# Patient Record
Sex: MALE | Race: WHITE | NOT HISPANIC OR LATINO | Employment: OTHER | URBAN - METROPOLITAN AREA
[De-identification: names, ages, dates, MRNs, and addresses within clinical notes are randomized per-mention and may not be internally consistent; named-entity substitution may affect disease eponyms.]

---

## 2019-10-22 ENCOUNTER — OFFICE VISIT (OUTPATIENT)
Dept: DERMATOLOGY | Facility: CLINIC | Age: 79
End: 2019-10-22
Payer: MEDICARE

## 2019-10-22 VITALS — WEIGHT: 285 LBS

## 2019-10-22 DIAGNOSIS — D48.5 NEOPLASM OF UNCERTAIN BEHAVIOR OF SKIN: Primary | ICD-10-CM

## 2019-10-22 DIAGNOSIS — R20.2 NOTALGIA PARESTHETICA: ICD-10-CM

## 2019-10-22 DIAGNOSIS — L81.4 LENTIGINES: ICD-10-CM

## 2019-10-22 DIAGNOSIS — Z85.828 HISTORY OF SKIN CANCER: ICD-10-CM

## 2019-10-22 DIAGNOSIS — L82.1 SEBORRHEIC KERATOSES: ICD-10-CM

## 2019-10-22 PROCEDURE — 1101F PR PT FALLS ASSESS DOC 0-1 FALLS W/OUT INJ PAST YR: ICD-10-PCS | Mod: HCWC,CPTII,S$GLB, | Performed by: DERMATOLOGY

## 2019-10-22 PROCEDURE — 1101F PT FALLS ASSESS-DOCD LE1/YR: CPT | Mod: HCWC,CPTII,S$GLB, | Performed by: DERMATOLOGY

## 2019-10-22 PROCEDURE — 99202 PR OFFICE/OUTPT VISIT, NEW, LEVL II, 15-29 MIN: ICD-10-PCS | Mod: 25,HCWC,S$GLB, | Performed by: DERMATOLOGY

## 2019-10-22 PROCEDURE — 88305 TISSUE SPECIMEN TO PATHOLOGY, DERMATOLOGY: ICD-10-PCS | Mod: 26,HCWC,, | Performed by: PATHOLOGY

## 2019-10-22 PROCEDURE — 99999 PR PBB SHADOW E&M-NEW PATIENT-LVL II: CPT | Mod: PBBFAC,HCWC,, | Performed by: DERMATOLOGY

## 2019-10-22 PROCEDURE — 88305 TISSUE EXAM BY PATHOLOGIST: CPT | Mod: HCWC | Performed by: PATHOLOGY

## 2019-10-22 PROCEDURE — 99999 PR PBB SHADOW E&M-NEW PATIENT-LVL II: ICD-10-PCS | Mod: PBBFAC,HCWC,, | Performed by: DERMATOLOGY

## 2019-10-22 PROCEDURE — 11102 TANGNTL BX SKIN SINGLE LES: CPT | Mod: HCWC,S$GLB,, | Performed by: DERMATOLOGY

## 2019-10-22 PROCEDURE — 11102 PR TANGENTIAL BIOPSY, SKIN, SINGLE LESION: ICD-10-PCS | Mod: HCWC,S$GLB,, | Performed by: DERMATOLOGY

## 2019-10-22 PROCEDURE — 99202 OFFICE O/P NEW SF 15 MIN: CPT | Mod: 25,HCWC,S$GLB, | Performed by: DERMATOLOGY

## 2019-10-22 NOTE — PROGRESS NOTES
Subjective:       Patient ID:  Corey Esquivel is a 79 y.o. male who presents for   Chief Complaint   Patient presents with    Skin Check     UBSE    Itching     back     This is a high risk patient here to check for the development of new lesions.  New lesion on neck which he has scratched, previously seen per Dr Ochsner has had 2 bcc removed on forehead.     Itching  - Initial  Affected locations: face, back, chest, left arm, right arm and forehead  Signs / symptoms: itching  Aggravated by: nothing  Relieving factors/Treatments tried: nothing        Review of Systems   Constitutional: Negative for fever, chills, weight loss, weight gain, fatigue, night sweats and malaise.   Skin: Positive for itching, daily sunscreen use, activity-related sunscreen use and wears hat.   Hematologic/Lymphatic: Bruises/bleeds easily.        Objective:    Physical Exam   Constitutional: He appears well-developed and well-nourished. No distress.   Neurological: He is alert and oriented to person, place, and time. He is not disoriented.   Psychiatric: He has a normal mood and affect.   Skin:   Areas Examined (abnormalities noted in diagram):   Scalp / Hair Palpated and Inspected  Head / Face Inspection Performed  Neck Inspection Performed  Chest / Axilla Inspection Performed  Abdomen Inspection Performed  Back Inspection Performed  RUE Inspected  LUE Inspection Performed                   Diagram Legend     Erythematous scaling macule/papule c/w actinic keratosis       Vascular papule c/w angioma      Pigmented verrucoid papule/plaque c/w seborrheic keratosis      Yellow umbilicated papule c/w sebaceous hyperplasia      Irregularly shaped tan macule c/w lentigo     1-2 mm smooth white papules consistent with Milia      Movable subcutaneous cyst with punctum c/w epidermal inclusion cyst      Subcutaneous movable cyst c/w pilar cyst      Firm pink to brown papule c/w dermatofibroma      Pedunculated fleshy papule(s) c/w skin tag(s)       Evenly pigmented macule c/w junctional nevus     Mildly variegated pigmented, slightly irregular-bordered macule c/w mildly atypical nevus      Flesh colored to evenly pigmented papule c/w intradermal nevus       Pink pearly papule/plaque c/w basal cell carcinoma      Erythematous hyperkeratotic cursted plaque c/w SCC      Surgical scar with no sign of skin cancer recurrence      Open and closed comedones      Inflammatory papules and pustules      Verrucoid papule consistent consistent with wart     Erythematous eczematous patches and plaques     Dystrophic onycholytic nail with subungual debris c/w onychomycosis     Umbilicated papule    Erythematous-base heme-crusted tan verrucoid plaque consistent with inflamed seborrheic keratosis     Erythematous Silvery Scaling Plaque c/w Psoriasis     See annotation      Assessment / Plan:      Pathology Orders:     Normal Orders This Visit    Tissue Specimen To Pathology, Dermatology     Questions:    Directional Terms:  Other(comment)    Clinical Information:  r/o bcc    Specific Site:  left anterior neck        Neoplasm of uncertain behavior of skin  -     Tissue Specimen To Pathology, Dermatology  Shave biopsy performed after verbal consent including risk of infection, scar, recurrence, need for additional treatment of site. Area prepped with alcohol, anesthetized with 1% lidocaine with epinephrine. . Hemostasis achieved with monsels. No complications. Dressing applied. Wound care explained. Will need further rx if + ca          History of skin cancer  Comments:  bcc x 2 on forehead    Lentigines  sunscreen    Seborrheic keratoses  reassurance      Notalgia paresthetica  No hot water  cerave with pramoxine           Follow up in about 6 months (around 4/22/2020).

## 2019-10-25 ENCOUNTER — TELEPHONE (OUTPATIENT)
Dept: CARDIOLOGY | Facility: CLINIC | Age: 79
End: 2019-10-25

## 2019-10-25 NOTE — TELEPHONE ENCOUNTER
----- Message from Renate Cheney sent at 10/25/2019 11:10 AM CDT -----  Contact: pt daughter  Pt daughter shay called looking to schedule for her father ASAP he is a new pt and next anabella not until dec     Pt is having blood pressure and heart rate issues    Pt daughter shay can be reached at [phone]

## 2019-10-25 NOTE — TELEPHONE ENCOUNTER
Spoke to pt daughter, Leigh Ann     Stated she will speak to you to accommodate the pt in for sooner appointment

## 2019-10-31 ENCOUNTER — OFFICE VISIT (OUTPATIENT)
Dept: CARDIOLOGY | Facility: CLINIC | Age: 79
End: 2019-10-31
Payer: MEDICARE

## 2019-10-31 ENCOUNTER — LAB VISIT (OUTPATIENT)
Dept: LAB | Facility: HOSPITAL | Age: 79
End: 2019-10-31
Attending: INTERNAL MEDICINE
Payer: MEDICARE

## 2019-10-31 VITALS
WEIGHT: 251 LBS | HEIGHT: 76 IN | BODY MASS INDEX: 30.56 KG/M2 | DIASTOLIC BLOOD PRESSURE: 80 MMHG | HEART RATE: 53 BPM | SYSTOLIC BLOOD PRESSURE: 140 MMHG

## 2019-10-31 DIAGNOSIS — E66.09 CLASS 1 OBESITY DUE TO EXCESS CALORIES WITHOUT SERIOUS COMORBIDITY WITH BODY MASS INDEX (BMI) OF 30.0 TO 30.9 IN ADULT: ICD-10-CM

## 2019-10-31 DIAGNOSIS — R00.1 BRADYCARDIA: Primary | ICD-10-CM

## 2019-10-31 DIAGNOSIS — E78.2 MIXED HYPERLIPIDEMIA: ICD-10-CM

## 2019-10-31 DIAGNOSIS — I44.0 FIRST DEGREE AV BLOCK: ICD-10-CM

## 2019-10-31 LAB
ALBUMIN SERPL BCP-MCNC: 3.3 G/DL (ref 3.5–5.2)
ALP SERPL-CCNC: 123 U/L (ref 55–135)
ALT SERPL W/O P-5'-P-CCNC: 27 U/L (ref 10–44)
ANION GAP SERPL CALC-SCNC: 7 MMOL/L (ref 8–16)
AST SERPL-CCNC: 38 U/L (ref 10–40)
BASOPHILS # BLD AUTO: 0.02 K/UL (ref 0–0.2)
BASOPHILS NFR BLD: 0.5 % (ref 0–1.9)
BILIRUB SERPL-MCNC: 1.2 MG/DL (ref 0.1–1)
BUN SERPL-MCNC: 15 MG/DL (ref 8–23)
CALCIUM SERPL-MCNC: 9.4 MG/DL (ref 8.7–10.5)
CHLORIDE SERPL-SCNC: 107 MMOL/L (ref 95–110)
CHOLEST SERPL-MCNC: 150 MG/DL (ref 120–199)
CHOLEST/HDLC SERPL: 3.1 {RATIO} (ref 2–5)
CO2 SERPL-SCNC: 28 MMOL/L (ref 23–29)
CREAT SERPL-MCNC: 1 MG/DL (ref 0.5–1.4)
DIFFERENTIAL METHOD: ABNORMAL
EOSINOPHIL # BLD AUTO: 0.3 K/UL (ref 0–0.5)
EOSINOPHIL NFR BLD: 5.6 % (ref 0–8)
ERYTHROCYTE [DISTWIDTH] IN BLOOD BY AUTOMATED COUNT: 13.2 % (ref 11.5–14.5)
EST. GFR  (AFRICAN AMERICAN): >60 ML/MIN/1.73 M^2
EST. GFR  (NON AFRICAN AMERICAN): >60 ML/MIN/1.73 M^2
GLUCOSE SERPL-MCNC: 106 MG/DL (ref 70–110)
HCT VFR BLD AUTO: 40.8 % (ref 40–54)
HDLC SERPL-MCNC: 49 MG/DL (ref 40–75)
HDLC SERPL: 32.7 % (ref 20–50)
HGB BLD-MCNC: 13.3 G/DL (ref 14–18)
LDLC SERPL CALC-MCNC: 87 MG/DL (ref 63–159)
LYMPHOCYTES # BLD AUTO: 1.7 K/UL (ref 1–4.8)
LYMPHOCYTES NFR BLD: 37.4 % (ref 18–48)
MCH RBC QN AUTO: 31.7 PG (ref 27–31)
MCHC RBC AUTO-ENTMCNC: 32.6 G/DL (ref 32–36)
MCV RBC AUTO: 97 FL (ref 82–98)
MONOCYTES # BLD AUTO: 0.4 K/UL (ref 0.3–1)
MONOCYTES NFR BLD: 8.6 % (ref 4–15)
NEUTROPHILS # BLD AUTO: 2.1 K/UL (ref 1.8–7.7)
NEUTROPHILS NFR BLD: 47.9 % (ref 38–73)
NONHDLC SERPL-MCNC: 101 MG/DL
PLATELET # BLD AUTO: 160 K/UL (ref 150–350)
PMV BLD AUTO: 10.4 FL (ref 9.2–12.9)
POTASSIUM SERPL-SCNC: 4.3 MMOL/L (ref 3.5–5.1)
PROT SERPL-MCNC: 6.5 G/DL (ref 6–8.4)
RBC # BLD AUTO: 4.2 M/UL (ref 4.6–6.2)
SODIUM SERPL-SCNC: 142 MMOL/L (ref 136–145)
TRIGL SERPL-MCNC: 70 MG/DL (ref 30–150)
WBC # BLD AUTO: 4.44 K/UL (ref 3.9–12.7)

## 2019-10-31 PROCEDURE — 1101F PT FALLS ASSESS-DOCD LE1/YR: CPT | Mod: HCWC,CPTII,S$GLB, | Performed by: INTERNAL MEDICINE

## 2019-10-31 PROCEDURE — 99205 PR OFFICE/OUTPT VISIT, NEW, LEVL V, 60-74 MIN: ICD-10-PCS | Mod: HCWC,S$GLB,, | Performed by: INTERNAL MEDICINE

## 2019-10-31 PROCEDURE — 85025 COMPLETE CBC W/AUTO DIFF WBC: CPT | Mod: HCWC

## 2019-10-31 PROCEDURE — 80061 LIPID PANEL: CPT | Mod: HCWC

## 2019-10-31 PROCEDURE — 36415 COLL VENOUS BLD VENIPUNCTURE: CPT | Mod: HCWC

## 2019-10-31 PROCEDURE — 99999 PR PBB SHADOW E&M-EST. PATIENT-LVL III: ICD-10-PCS | Mod: PBBFAC,HCWC,, | Performed by: INTERNAL MEDICINE

## 2019-10-31 PROCEDURE — 99999 PR PBB SHADOW E&M-EST. PATIENT-LVL III: CPT | Mod: PBBFAC,HCWC,, | Performed by: INTERNAL MEDICINE

## 2019-10-31 PROCEDURE — 1101F PR PT FALLS ASSESS DOC 0-1 FALLS W/OUT INJ PAST YR: ICD-10-PCS | Mod: HCWC,CPTII,S$GLB, | Performed by: INTERNAL MEDICINE

## 2019-10-31 PROCEDURE — 93000 EKG 12-LEAD: ICD-10-PCS | Mod: HCWC,S$GLB,, | Performed by: INTERNAL MEDICINE

## 2019-10-31 PROCEDURE — 93000 ELECTROCARDIOGRAM COMPLETE: CPT | Mod: HCWC,S$GLB,, | Performed by: INTERNAL MEDICINE

## 2019-10-31 PROCEDURE — 99205 OFFICE O/P NEW HI 60 MIN: CPT | Mod: HCWC,S$GLB,, | Performed by: INTERNAL MEDICINE

## 2019-10-31 PROCEDURE — 80053 COMPREHEN METABOLIC PANEL: CPT | Mod: HCWC

## 2019-10-31 NOTE — PATIENT INSTRUCTIONS
Low-Salt Choices  Eating salt (sodium) can make your body retain too much water. Excess water makes your heart work harder. Canned, packaged, and frozen foods are easy to prepare, but they are often high in sodium. Here are some ideas for low-salt foods you can easily prepare yourself.    For breakfast  · Fruit or 100% fruit juice  · Whole-wheat bread or an English muffin. Compare sodium content on labels.  · Low-fat milk or yogurt  · Unsalted eggs  · Shredded wheat  · Corn tortillas  · Unsalted steamed rice  · Regular (not instant) hot cereal, made without salt  Stay away from:  · Sausage, royal, and ham  · Flour tortillas  · Packaged muffins, pancakes, and biscuits  · Instant hot cereals  · Cottage cheese  For lunch and dinner  · Fresh fish, chicken, turkey, or meat--baked, broiled, or roasted without salt  · Dry beans, cooked without salt  · Tofu, stir-fried without salt  · Unsalted fresh fruit and vegetables, or frozen or canned fruit and vegetables with no added salt  Stay away from:  · Lunch or deli meat that is cured or smoked  · Cheese  · Tomato juice and catsup  · Canned vegetables, soups, and fish not labeled as no-salt-added or reduced sodium  · Packaged gravies and sauces  · Olives, pickles, and relish  · Bottled salad dressings  For snacks and desserts  · Yogurt  · Unsalted, air popped popcorn  · Unsalted nuts or seeds  Stay away from:  · Pies and cakes  · Packaged dessert mixes  · Pizza  · Canned and packaged puddings  · Pretzels, chips, crackers, and nuts--unless the label says unsalted  Date Last Reviewed: 6/17/2015  © 1775-8855 [employer]. [address] [place] [address], [place] [address]. All rights reserved. This information is not intended as a substitute for professional medical care. Always follow your healthcare professional's instructions.        Low-Salt Choices  Eating salt (sodium) can make your body retain too much water. Excess water makes your heart work harder. Canned,  packaged, and frozen foods are easy to prepare, but they are often high in sodium. Here are some ideas for low-salt foods you can easily prepare yourself.    For breakfast  · Fruit or 100% fruit juice  · Whole-wheat bread or an English muffin. Compare sodium content on labels.  · Low-fat milk or yogurt  · Unsalted eggs  · Shredded wheat  · Corn tortillas  · Unsalted steamed rice  · Regular (not instant) hot cereal, made without salt  Stay away from:  · Sausage, royal, and ham  · Flour tortillas  · Packaged muffins, pancakes, and biscuits  · Instant hot cereals  · Cottage cheese  For lunch and dinner  · Fresh fish, chicken, turkey, or meat--baked, broiled, or roasted without salt  · Dry beans, cooked without salt  · Tofu, stir-fried without salt  · Unsalted fresh fruit and vegetables, or frozen or canned fruit and vegetables with no added salt  Stay away from:  · Lunch or deli meat that is cured or smoked  · Cheese  · Tomato juice and catsup  · Canned vegetables, soups, and fish not labeled as no-salt-added or reduced sodium  · Packaged gravies and sauces  · Olives, pickles, and relish  · Bottled salad dressings  For snacks and desserts  · Yogurt  · Unsalted, air popped popcorn  · Unsalted nuts or seeds  Stay away from:  · Pies and cakes  · Packaged dessert mixes  · Pizza  · Canned and packaged puddings  · Pretzels, chips, crackers, and nuts--unless the label says unsalted  Date Last Reviewed: 6/17/2015  © 0033-6554 [employer]. [address] [place] [address], [place] [address]. All rights reserved. This information is not intended as a substitute for professional medical care. Always follow your healthcare professional's instructions.

## 2019-10-31 NOTE — PROGRESS NOTES
Subjective:   Patient ID:  Corey Esquivel is a 79 y.o. male who presents for evaluation of Bradycardia; first degree AVB; and Obesity      HPI:       He is here with his wife to establish cardiovascular care. ECG revealed SB with 1 AVB, HR 52. He is asymptomatic. No cardiac issues. He lost 40 lbs with exercise and dietary changes in past 4 months. BP at home is 120/70 mmHg range.             Patient Active Problem List    Diagnosis Date Noted    Class 1 obesity due to excess calories without serious comorbidity with body mass index (BMI) of 30.0 to 30.9 in adult 10/31/2019    Bradycardia 10/31/2019    First degree AV block 10/31/2019    Mixed hyperlipidemia 10/31/2019           Right Arm BP - Sittin/80  Left Arm BP - Sittin/80            Review of Systems   Constitution: Negative for diaphoresis, night sweats, weight gain and weight loss.   HENT: Negative for congestion.    Eyes: Negative for blurred vision, discharge and double vision.   Cardiovascular: Negative for chest pain, claudication, cyanosis, dyspnea on exertion, irregular heartbeat, leg swelling, near-syncope, orthopnea, palpitations, paroxysmal nocturnal dyspnea and syncope.   Respiratory: Negative for cough, shortness of breath and wheezing.    Endocrine: Negative for cold intolerance, heat intolerance and polyphagia.   Hematologic/Lymphatic: Negative for adenopathy and bleeding problem. Does not bruise/bleed easily.   Skin: Negative for dry skin and nail changes.   Musculoskeletal: Negative for arthritis, back pain, falls, joint pain, myalgias and neck pain.   Gastrointestinal: Negative for bloating, abdominal pain, change in bowel habit and constipation.   Genitourinary: Negative for bladder incontinence, dysuria, flank pain, genital sores and missed menses.   Neurological: Negative for aphonia, brief paralysis, difficulty with concentration, dizziness and weakness.   Psychiatric/Behavioral: Negative for altered mental status and memory  loss. The patient does not have insomnia.    Allergic/Immunologic: Negative for environmental allergies.       Objective:   Physical Exam   Constitutional: He is oriented to person, place, and time. He appears well-developed and well-nourished. He is not intubated.   HENT:   Head: Normocephalic and atraumatic.   Right Ear: External ear normal.   Left Ear: External ear normal.   Mouth/Throat: Oropharynx is clear and moist.   Eyes: Pupils are equal, round, and reactive to light. Conjunctivae and EOM are normal. Right eye exhibits no discharge. Left eye exhibits no discharge. No scleral icterus.   Neck: Normal range of motion. Neck supple. Normal carotid pulses, no hepatojugular reflux and no JVD present. Carotid bruit is not present. No tracheal deviation present. No thyromegaly present.   Cardiovascular: Normal rate, regular rhythm, S1 normal and S2 normal.  No extrasystoles are present. PMI is not displaced. Exam reveals no gallop, no S3, no distant heart sounds, no friction rub and no midsystolic click.   No murmur heard.  Pulses:       Carotid pulses are 2+ on the right side, and 2+ on the left side.       Radial pulses are 2+ on the right side, and 2+ on the left side.        Femoral pulses are 2+ on the right side, and 2+ on the left side.       Popliteal pulses are 2+ on the right side, and 2+ on the left side.        Dorsalis pedis pulses are 2+ on the right side, and 2+ on the left side.        Posterior tibial pulses are 2+ on the right side, and 2+ on the left side.   Pulmonary/Chest: Effort normal and breath sounds normal. No accessory muscle usage or stridor. No apnea, no tachypnea and no bradypnea. He is not intubated. No respiratory distress. He has no decreased breath sounds. He has no wheezes. He has no rales. He exhibits no tenderness and no bony tenderness.   Abdominal: He exhibits no distension, no pulsatile liver, no abdominal bruit, no ascites, no pulsatile midline mass and no mass. There is no  tenderness. There is no rebound and no guarding.   Musculoskeletal: Normal range of motion. He exhibits no edema or tenderness.   Lymphadenopathy:     He has no cervical adenopathy.   Neurological: He is alert and oriented to person, place, and time. He has normal reflexes. No cranial nerve deficit. Coordination normal.   Skin: Skin is warm. No rash noted. No erythema. No pallor.   Psychiatric: He has a normal mood and affect. His behavior is normal. Judgment and thought content normal.       Assessment:     1. Bradycardia    2. Mixed hyperlipidemia    3. Class 1 obesity due to excess calories without serious comorbidity with body mass index (BMI) of 30.0 to 30.9 in adult    4. First degree AV block        Plan:           Weight loss  Exercise  Low salt diet        No intervention for asymptomatic bradycardia  Reassurance         Lipid panel, cbc, and cmp for wellness        Continue with current medical plan and lifestyle changes.  Return sooner for concerns or questions. If symptoms persist go to the ED  I have reviewed all pertinent data on this patient       I have reviewed the patient's medical history in detail and updated the computerized patient record.    Orders Placed This Encounter   Procedures    CBC auto differential     Standing Status:   Future     Number of Occurrences:   1     Standing Expiration Date:   12/29/2020    Comprehensive metabolic panel     Standing Status:   Future     Number of Occurrences:   1     Standing Expiration Date:   12/29/2020    Lipid panel     Standing Status:   Future     Number of Occurrences:   1     Standing Expiration Date:   12/29/2020    IN OFFICE EKG 12-LEAD (to Muse)     Order Specific Question:   Diagnosis     Answer:   Bradycardia [[id number]]       Follow up as scheduled. Return sooner for concerns or questions            He expressed verbal understanding and agreed with the plan        Patient's Medications   New Prescriptions    No medications on file    Previous Medications    CETIRIZINE HCL/PSEUDOEPHEDRINE (ZYRTEC-D ORAL)    Take by mouth.    GLUC/MICHAEL-MSM#2/C/D3/MARK/BORN (GLUCOSAM-CHONDR-MSM WITH VIT D ORAL)    Take by mouth.   Modified Medications    No medications on file   Discontinued Medications    No medications on file

## 2019-11-22 ENCOUNTER — PROCEDURE VISIT (OUTPATIENT)
Dept: DERMATOLOGY | Facility: CLINIC | Age: 79
End: 2019-11-22
Payer: MEDICARE

## 2019-11-22 VITALS
DIASTOLIC BLOOD PRESSURE: 79 MMHG | WEIGHT: 251.13 LBS | BODY MASS INDEX: 30.58 KG/M2 | SYSTOLIC BLOOD PRESSURE: 146 MMHG | HEART RATE: 54 BPM | HEIGHT: 76 IN

## 2019-11-22 DIAGNOSIS — C44.41 BASAL CELL CARCINOMA OF LEFT SIDE OF NECK: Primary | ICD-10-CM

## 2019-11-22 PROCEDURE — 13132 CMPLX RPR F/C/C/M/N/AX/G/H/F: CPT | Mod: 51,HCWC,S$GLB, | Performed by: DERMATOLOGY

## 2019-11-22 PROCEDURE — 13132 PR RECMPL WND HEAD,FAC,HAND 2.6-7.5 CM: ICD-10-PCS | Mod: 51,HCWC,S$GLB, | Performed by: DERMATOLOGY

## 2019-11-22 PROCEDURE — 99499 NO LOS: ICD-10-PCS | Mod: HCWC,S$GLB,, | Performed by: DERMATOLOGY

## 2019-11-22 PROCEDURE — 17311: ICD-10-PCS | Mod: HCWC,S$GLB,, | Performed by: DERMATOLOGY

## 2019-11-22 PROCEDURE — 17312: ICD-10-PCS | Mod: HCWC,S$GLB,, | Performed by: DERMATOLOGY

## 2019-11-22 PROCEDURE — 17312 MOHS ADDL STAGE: CPT | Mod: HCWC,S$GLB,, | Performed by: DERMATOLOGY

## 2019-11-22 PROCEDURE — 17311 MOHS 1 STAGE H/N/HF/G: CPT | Mod: HCWC,S$GLB,, | Performed by: DERMATOLOGY

## 2019-11-22 PROCEDURE — 99499 UNLISTED E&M SERVICE: CPT | Mod: HCWC,S$GLB,, | Performed by: DERMATOLOGY

## 2019-11-22 RX ORDER — HYDROCODONE BITARTRATE AND ACETAMINOPHEN 5; 325 MG/1; MG/1
1 TABLET ORAL EVERY 6 HOURS PRN
Qty: 10 TABLET | Refills: 0 | Status: SHIPPED | OUTPATIENT
Start: 2019-11-22 | End: 2022-09-19 | Stop reason: ALTCHOICE

## 2019-12-03 ENCOUNTER — OFFICE VISIT (OUTPATIENT)
Dept: DERMATOLOGY | Facility: CLINIC | Age: 79
End: 2019-12-03
Payer: MEDICARE

## 2019-12-03 DIAGNOSIS — Z09 POSTOP CHECK: Primary | ICD-10-CM

## 2019-12-03 PROCEDURE — 99999 PR PBB SHADOW E&M-EST. PATIENT-LVL II: ICD-10-PCS | Mod: PBBFAC,HCWC,, | Performed by: DERMATOLOGY

## 2019-12-03 PROCEDURE — 99999 PR PBB SHADOW E&M-EST. PATIENT-LVL II: CPT | Mod: PBBFAC,HCWC,, | Performed by: DERMATOLOGY

## 2019-12-03 PROCEDURE — 99024 PR POST-OP FOLLOW-UP VISIT: ICD-10-PCS | Mod: HCWC,S$GLB,, | Performed by: DERMATOLOGY

## 2019-12-03 PROCEDURE — 99024 POSTOP FOLLOW-UP VISIT: CPT | Mod: HCWC,S$GLB,, | Performed by: DERMATOLOGY

## 2019-12-03 NOTE — PROGRESS NOTES
79 y.o. male patient is here for suture removal following Mohs' surgery.    Patient reports no problems.    WOUND PE:  The L anterior neck sutures intact. Wound healing well. Good skin edges. No signs or symptoms of infection.    IMPRESSION:  Healing operative site from Mohs' surgery, BCC L anterior neck s/p Mohs with CLC, postop day #7.    PLAN:  Sutures removed today. Steri-strips applied.  Continue wound care.  Keep moist with Aquaphor.    RTC:  In 3-6 months with Maria D Huddleston M.D. for skin check or sooner if new concern arises.

## 2022-07-16 ENCOUNTER — NURSE TRIAGE (OUTPATIENT)
Dept: ADMINISTRATIVE | Facility: CLINIC | Age: 82
End: 2022-07-16
Payer: MEDICARE

## 2022-07-16 NOTE — TELEPHONE ENCOUNTER
[place]    PCP:  Dr. ALIE Llamas    Pt escalated to triage queue for foot issues.  Pt states he already has an appt for Aug 25th and declines triage.  Instructed to call for questions/concerns.  VU.    Reason for Disposition   [1] Follow-up call to recent contact AND [2] information only call, no triage required    Additional Information   Negative: RN needs further essential information from caller in order to complete triage   Negative: Requesting regular office appointment   Negative: [1] Caller requesting NON-URGENT health information AND [2] PCP's office is the best resource   Negative: Health Information question, no triage required and triager able to answer question   Negative: General information question, no triage required and triager able to answer question   Negative: Question about upcoming scheduled test, no triage required and triager able to answer question   Negative: [1] Caller is not with the adult (patient) AND [2] probable NON-URGENT symptoms    Protocols used: INFORMATION ONLY CALL - [facility] TRIAGE[facility]

## 2022-09-19 ENCOUNTER — HOSPITAL ENCOUNTER (EMERGENCY)
Facility: HOSPITAL | Age: 82
Discharge: HOME | End: 2022-09-19
Attending: EMERGENCY MEDICINE
Payer: MEDICARE

## 2022-09-19 VITALS
WEIGHT: 270 LBS | BODY MASS INDEX: 32.88 KG/M2 | RESPIRATION RATE: 18 BRPM | HEART RATE: 68 BPM | TEMPERATURE: 99 F | DIASTOLIC BLOOD PRESSURE: 80 MMHG | SYSTOLIC BLOOD PRESSURE: 148 MMHG | OXYGEN SATURATION: 96 % | HEIGHT: 76 IN

## 2022-09-19 DIAGNOSIS — S61.215A LACERATION OF LEFT RING FINGER WITHOUT FOREIGN BODY WITHOUT DAMAGE TO NAIL, INITIAL ENCOUNTER: Primary | ICD-10-CM

## 2022-09-19 DIAGNOSIS — S61.213A LACERATION OF LEFT MIDDLE FINGER WITHOUT FOREIGN BODY WITHOUT DAMAGE TO NAIL, INITIAL ENCOUNTER: ICD-10-CM

## 2022-09-19 PROCEDURE — 12002 RPR S/N/AX/GEN/TRNK2.6-7.5CM: CPT | Mod: 59

## 2022-09-19 PROCEDURE — 90715 TDAP VACCINE 7 YRS/> IM: CPT | Performed by: EMERGENCY MEDICINE

## 2022-09-19 PROCEDURE — 63600175 PHARM REV CODE 636 W HCPCS: Performed by: EMERGENCY MEDICINE

## 2022-09-19 PROCEDURE — 25000003 PHARM REV CODE 250: Performed by: EMERGENCY MEDICINE

## 2022-09-19 PROCEDURE — 99284 EMERGENCY DEPT VISIT MOD MDM: CPT | Mod: 25

## 2022-09-19 PROCEDURE — 12042 INTMD RPR N-HF/GENIT2.6-7.5: CPT

## 2022-09-19 PROCEDURE — 90471 IMMUNIZATION ADMIN: CPT | Performed by: EMERGENCY MEDICINE

## 2022-09-19 RX ORDER — LIDOCAINE HYDROCHLORIDE 10 MG/ML
5 INJECTION, SOLUTION EPIDURAL; INFILTRATION; INTRACAUDAL; PERINEURAL
Status: COMPLETED | OUTPATIENT
Start: 2022-09-19 | End: 2022-09-19

## 2022-09-19 RX ORDER — TRAMADOL HYDROCHLORIDE 50 MG/1
50 TABLET ORAL EVERY 12 HOURS PRN
Qty: 6 TABLET | Refills: 0 | Status: SHIPPED | OUTPATIENT
Start: 2022-09-19 | End: 2022-11-07

## 2022-09-19 RX ORDER — CEPHALEXIN 250 MG/1
250 CAPSULE ORAL EVERY 12 HOURS
Qty: 6 CAPSULE | Refills: 0 | Status: SHIPPED | OUTPATIENT
Start: 2022-09-19 | End: 2022-09-22

## 2022-09-19 RX ADMIN — LIDOCAINE HYDROCHLORIDE 50 MG: 10 INJECTION, SOLUTION EPIDURAL; INFILTRATION; INTRACAUDAL at 02:09

## 2022-09-19 RX ADMIN — TETANUS TOXOID, REDUCED DIPHTHERIA TOXOID AND ACELLULAR PERTUSSIS VACCINE, ADSORBED 0.5 ML: 5; 2.5; 8; 8; 2.5 SUSPENSION INTRAMUSCULAR at 02:09

## 2022-09-19 NOTE — ED PROVIDER NOTES
Encounter Date: 9/19/2022       History     Chief Complaint   Patient presents with    Laceration     Pt was using a new hand saw, and got laceration noted to left middle and ring finger bleeding controled      HPI  This is a 82 y.o. male who has no past medical history on file.     The patient presents to the Emergency Department with laceration to left hand after using a new hand saw.  Patient sustained lacerations to his middle and ring finger.  Denies any numbness or inability to fingers.  Denies any other injury.  Tetanus unknown.      Review of patient's allergies indicates:  No Known Allergies  No past medical history on file.  Past Surgical History:   Procedure Laterality Date    ABDOMINAL SURGERY      CHOLECYSTECTOMY      HERNIA REPAIR      SHOULDER SURGERY       No family history on file.  Social History     Tobacco Use    Smoking status: Former    Smokeless tobacco: Former   Substance Use Topics    Alcohol use: No    Drug use: No     Review of Systems   Constitutional:  Negative for activity change.   Skin:  Positive for wound.   Neurological:  Negative for weakness and numbness.   Hematological:  Bruises/bleeds easily.     Physical Exam     Initial Vitals [09/19/22 1236]   BP Pulse Resp Temp SpO2   (!) 148/80 68 18 98.5 °F (36.9 °C) 96 %      MAP       --         Physical Exam    Nursing note and vitals reviewed.  Constitutional: He appears well-developed and well-nourished. He is not diaphoretic. No distress.   HENT:   Head: Normocephalic and atraumatic.   Mouth/Throat: Oropharynx is clear and moist.   Eyes: Conjunctivae are normal.   Cardiovascular:  Normal rate and regular rhythm.           Pulmonary/Chest: No respiratory distress.     Neurological: He is alert and oriented to person, place, and time.   Skin: Skin is warm and dry. Capillary refill takes less than 2 seconds. No rash noted. No pallor.   Irregularly shaped laceration to left middle finger, full thickness skin, 3.5cm in length, no  vascular or muscle/tendon involvement.     Irregularly shaped laceration to left middle finger, full thickness skin, 3.5cm in length, no vascular or muscle/tendon involvement. There is a skin flap that covers the middle of the wound.    Multiple other small lacerations to the left hand including V-shaped wound at the 3rd MTP volar aspect about 0.5cm in length.   Psychiatric: He has a normal mood and affect.       ED Course   Lac Repair    Date/Time: 9/19/2022 4:23 PM  Performed by: Rajan Silva MD  Authorized by: Rajan Silva MD     Consent:     Consent obtained:  Verbal    Consent given by:  Patient    Risks, benefits, and alternatives were discussed: yes    Laceration details:     Location:  Finger    Finger location:  L long finger    Length (cm):  3.5    Depth (mm):  4  Pre-procedure details:     Preparation:  Patient was prepped and draped in usual sterile fashion  Exploration:     Hemostasis achieved with:  Direct pressure    Imaging outcome: foreign body not noted      Wound exploration: wound explored through full range of motion and entire depth of wound visualized      Wound extent: vascular damage      Wound extent: no tendon damage noted      Contaminated: no    Treatment:     Area cleansed with:  Povidone-iodine and saline    Amount of cleaning:  Extensive    Irrigation solution:  Sterile saline    Debridement:  Minimal    Undermining:  Minimal    Scar revision: no    Skin repair:     Repair method:  Sutures    Suture size:  5-0    Suture material:  Prolene    Suture technique:  Simple interrupted and figure eight    Number of sutures:  5  Approximation:     Approximation:  Close  Repair type:     Repair type:  Intermediate  Post-procedure details:     Dressing:  Non-adherent dressing    Procedure completion:  Tolerated well, no immediate complications  Lac Repair    Date/Time: 9/19/2022 4:26 PM  Performed by: Rajan Silva MD  Authorized by: Rajan Silva MD     Consent:     Consent obtained:   Verbal    Consent given by:  Patient    Risks, benefits, and alternatives were discussed: yes    Laceration details:     Location:  Finger    Finger location:  L ring finger    Length (cm):  3.5    Depth (mm):  3  Exploration:     Wound exploration: wound explored through full range of motion and entire depth of wound visualized      Wound extent: no muscle damage noted, no tendon damage noted and no vascular damage noted      Contaminated: no    Treatment:     Area cleansed with:  Povidone-iodine and saline    Amount of cleaning:  Standard    Irrigation solution:  Sterile saline    Debridement:  None    Undermining:  Minimal  Skin repair:     Repair method:  Sutures  Approximation:     Approximation:  Close  Repair type:     Repair type:  Simple  Post-procedure details:     Dressing:  Non-adherent dressing    Procedure completion:  Tolerated well, no immediate complications  Lac Repair    Date/Time: 9/19/2022 4:29 PM  Performed by: Rajan Silva MD  Authorized by: Rajan Silva MD     Consent:     Consent obtained:  Verbal    Consent given by:  Patient    Risks, benefits, and alternatives were discussed: yes    Laceration details:     Location:  Hand    Hand location:  L palm    Length (cm):  0.5    Depth (mm):  3  Exploration:     Wound extent: no muscle damage noted, no nerve damage noted and no vascular damage noted    Treatment:     Area cleansed with:  Povidone-iodine and saline    Amount of cleaning:  Standard    Irrigation solution:  Sterile saline    Debridement:  None    Undermining:  None  Skin repair:     Repair method:  Sutures    Suture size:  5-0    Suture material:  Prolene    Suture technique:  Simple interrupted    Number of sutures:  1  Approximation:     Approximation:  Close  Repair type:     Repair type:  Simple  Labs Reviewed - No data to display       Imaging Results              X-Ray Hand 2 View Left (Final result)  Result time 09/19/22 15:24:42      Final result by Justin Devries MD  (09/19/22 15:24:42)                   Impression:      No soft tissue abnormality.    Diffuse osteopenia.  No evidence of fracture or dislocation.    Osteoarthrosis of the left hand.      Electronically signed by: Justin Devries MD  Date:    09/19/2022  Time:    15:24               Narrative:    EXAMINATION:  XR HAND 2 VIEW LEFT    CLINICAL HISTORY:  Laceration without foreign body of left middle finger without damage to nail, initial encounter    TECHNIQUE:  Three x-ray views of the left hand.    COMPARISON:  None    FINDINGS:  There is diffuse demineralization of the osseous structures.  There is no cortical step-off.  There is no evidence of periostitis.    There is significant joint space narrowing and sclerosis involving the base of the 1st and 2nd metacarpals.  There is also marked joint space narrowing involving the 5th proximal and distal interphalangeal joints.    There is chondrocalcinosis of in the base of the proximal carpal row.  The soft tissues are unremarkable.  No radiopaque foreign body is identified.                                       Medications   Tdap (BOOSTRIX) vaccine injection 0.5 mL (0.5 mLs Intramuscular Given 9/19/22 1435)   LIDOcaine (PF) 10 mg/ml (1%) injection 50 mg (50 mg Infiltration Given 9/19/22 1439)                 ED Course as of 09/19/22 1628   Mon Sep 19, 2022   1454 X-Ray Hand 2 View Left  No fracture as independently interpreted by me [NP]      ED Course User Index  [NP] Rajan Silva MD          82-year-old male presents to the emergency department for evaluation of lacerations to left hand after her injury with a hand saw.  X-ray revealed no bony injury.  Lacerations repaired as above.    Patient advised that despite adequate irrigation and antibiotics possibility of retained body and/or infection exists, and swelling, redness, drainage, fever, or any other concerns should prompt immediate return to ED.    Discussed wound care instructions and suture removal in 10 days.        Clinical Impression:   Final diagnoses:  [S61.215A] Laceration of left ring finger without foreign body without damage to nail, initial encounter (Primary)  [C11.213A] Laceration of left middle finger without foreign body without damage to nail, initial encounter        ED Disposition Condition    Discharge Stable          ED Prescriptions       Medication Sig Dispense Start Date End Date Auth. Provider    cephALEXin (KEFLEX) 250 MG capsule Take 1 capsule (250 mg total) by mouth every 12 (twelve) hours. for 3 days 6 capsule 9/19/2022 9/22/2022 Rajan Silva MD    traMADoL (ULTRAM) 50 mg tablet Take 1 tablet (50 mg total) by mouth every 12 (twelve) hours as needed for Pain. 6 tablet 9/19/2022 -- Rajan Silva MD          Follow-up Information       Follow up With Specialties Details Why Contact Info    your PCP  Schedule an appointment as soon as possible for a visit in 10 days For suture removal              Rajan Silva MD  09/19/22 4298       Rajan Silva MD  09/19/22 8603

## 2022-09-29 ENCOUNTER — HOSPITAL ENCOUNTER (EMERGENCY)
Facility: HOSPITAL | Age: 82
Discharge: HOME | End: 2022-09-29
Attending: EMERGENCY MEDICINE
Payer: MEDICARE

## 2022-09-29 VITALS
BODY MASS INDEX: 31.66 KG/M2 | HEIGHT: 76 IN | RESPIRATION RATE: 18 BRPM | DIASTOLIC BLOOD PRESSURE: 75 MMHG | WEIGHT: 260 LBS | OXYGEN SATURATION: 95 % | SYSTOLIC BLOOD PRESSURE: 150 MMHG | HEART RATE: 61 BPM | TEMPERATURE: 98 F

## 2022-09-29 DIAGNOSIS — Z48.02 VISIT FOR SUTURE REMOVAL: Primary | ICD-10-CM

## 2022-09-29 PROCEDURE — 99999 HC NO LEVEL OF SERVICE - ED ONLY: CPT

## 2022-09-29 PROCEDURE — 99283 EMERGENCY DEPT VISIT LOW MDM: CPT

## 2022-09-29 PROCEDURE — 25000003 PHARM REV CODE 250

## 2022-09-29 RX ORDER — BACITRACIN ZINC 500 UNIT/G
OINTMENT (GRAM) TOPICAL 2 TIMES DAILY
Qty: 30 G | Refills: 0 | Status: SHIPPED | OUTPATIENT
Start: 2022-09-29 | End: 2022-11-07

## 2022-09-29 RX ADMIN — NEOMYCIN-BACITRACIN-POLYMYXIN OINT 1 EACH: OINTMENT at 12:09

## 2022-09-29 NOTE — ED PROVIDER NOTES
Encounter Date: 9/29/2022       History     Chief Complaint   Patient presents with    Suture / Staple Removal     Report to ED to have stitches removed from left hand, reports in place for 10 days, closely approximated and free of drainage     Patient is a 82 year old male who presents to the ED for suture removal. Patient cut his left middle and ring finger using a saw 10 days ago. He denies any drainage or bleeding. He denies fever, numbness or tingling.    A ten point review of systems was completed and is negative except as documented above.  Patient denies any other acute medical complaint.    The patients available PMH, PSH, Social History, medications, allergies, and triage vital signs were reviewed just prior to their medical evaluation.      Review of patient's allergies indicates:  No Known Allergies  History reviewed. No pertinent past medical history.  Past Surgical History:   Procedure Laterality Date    ABDOMINAL SURGERY      CHOLECYSTECTOMY      HERNIA REPAIR      SHOULDER SURGERY       History reviewed. No pertinent family history.  Social History     Tobacco Use    Smoking status: Former    Smokeless tobacco: Former   Substance Use Topics    Alcohol use: No    Drug use: No     Review of Systems   Constitutional:  Negative for fever.   HENT:  Negative for sore throat.    Respiratory:  Negative for shortness of breath.    Cardiovascular:  Negative for chest pain.   Gastrointestinal:  Negative for nausea.   Genitourinary:  Negative for dysuria.   Musculoskeletal:  Negative for back pain, gait problem and neck pain.   Skin:  Positive for wound. Negative for rash.   Neurological:  Negative for weakness.   Hematological:  Does not bruise/bleed easily.   Psychiatric/Behavioral:  Negative for agitation and behavioral problems.      Physical Exam     Initial Vitals [09/29/22 1146]   BP Pulse Resp Temp SpO2   (!) 150/75 61 18 98.1 °F (36.7 °C) 95 %      MAP       --         Physical Exam    Nursing note and  vitals reviewed.  Constitutional: He appears well-developed and well-nourished. No distress.   HENT:   Head: Normocephalic and atraumatic.   Eyes: Pupils are equal, round, and reactive to light. Right eye exhibits no discharge.   Neck: Neck supple.   Cardiovascular:  Normal rate and regular rhythm.           Pulmonary/Chest: Breath sounds normal. No respiratory distress.   Abdominal: Abdomen is soft. He exhibits no distension. There is no abdominal tenderness.   Musculoskeletal:         General: Normal range of motion.      Cervical back: Neck supple.     Neurological: He is alert and oriented to person, place, and time.   Skin: Skin is warm and dry.   Lacerations to left middle and ring fingers with sutures in place  Suture line clean, dry and intact  Full ROM, sensation intact     Psychiatric: He has a normal mood and affect. His behavior is normal.       ED Course   Suture Removal    Date/Time: 9/29/2022 3:22 PM  Location procedure was performed: [facility] EMERGENCY DEPARTMENT  Performed by: Emma Bajwa PA-C  Authorized by: Neel Ramos MD   Body area: upper extremity (Left middle and ring finger)  Wound Appearance: nontender, well healed and clean  Post-removal: antibiotic ointment applied  Facility: sutures placed in this facility  Complications: No  Specimens: No  Implants: No      Labs Reviewed - No data to display       Imaging Results    None          Medications   neomycin-bacitracnZn-polymyxnB packet 1 each (1 each Topical (Top) Given 9/29/22 1230)     Medical Decision Making:   Initial Assessment:   Patient is a 82 year old male who presents to the ED for suture removal. Patient cut his left middle and ring finger using a saw 10 days ago. He denies any drainage or bleeding. He denies fever, numbness or tingling.      Differential Diagnosis:   Suture removal  ED Management:  Suture removal  -Afebrile, vital signs stable, no apparent distress  -Sutures removed in the ED  -Pt tolerated  procedure well, no immediate complications    Plan:  -Antibiotic ointment as needed   -Patient is in stable condition to be discharged home. Advised patient to follow up with primary care doctor and return to the ED if experiencing any worsening of symptoms.                   Emma Bajwa PA-C  Emergency Medicine           Clinical Impression:   Final diagnoses:  [Z48.02] Visit for suture removal (Primary)        ED Disposition Condition    Discharge Stable          ED Prescriptions       Medication Sig Dispense Start Date End Date Auth. Provider    bacitracin 500 unit/gram Oint Apply topically 2 (two) times daily. 30 g 9/29/2022 -- Emma Bajwa PA-C          Follow-up Information    None          Emma Bajwa PA-C  09/29/22 3251

## 2022-09-29 NOTE — DISCHARGE INSTRUCTIONS
-F/u with primary care doctor and return to the ER if you are experiencing any worsening of symptoms    Thank you for letting myself and our team take care for you today! It was nice meeting you, and I hope you feel better very soon. Please come back to [facility] for all of your future medical needs.   Our goal in the ER is to always give you outstanding care and exceptional service. You may receive a survey by mail or email in the next week about your experience in our ED. We would greatly appreciate you completing and returning the survey. Your feedback provides us with a way to recognize our staff who give very good care and it helps us learn how to improve when your experience was below our aspiration of excellence.     Sincerely,     Emma Bajwa PA-C  Emergency Room Physician Assistant  [facility]

## 2022-09-29 NOTE — ED NOTES
Pt presents to ED from  home for suture removal. Pt had stitches placed to left 2nd and 3rd finger 10 days ago. Mechanism of injury was a chain saw. No redness or drainage noted. Lacerations appear to be healing well.     Pt is alert, age appropriate and in no acute distress. Pt is nontoxic appearing. Respirations are even and unlabored. pt denies change in feeding, bowel or bladder habits. Skin is warm and color is appropriate for ethnicity.   Pt moves all extremities well. Conjunctivae normal. Pt is dressed appropriately and well groomed

## 2022-11-07 ENCOUNTER — HOSPITAL ENCOUNTER (OUTPATIENT)
Facility: HOSPITAL | Age: 82
Discharge: HOME | End: 2022-11-09
Attending: EMERGENCY MEDICINE | Admitting: HOSPITALIST
Payer: MEDICARE

## 2022-11-07 DIAGNOSIS — R06.02 SHORTNESS OF BREATH: ICD-10-CM

## 2022-11-07 DIAGNOSIS — R17 TOTAL BILIRUBIN, ELEVATED: ICD-10-CM

## 2022-11-07 DIAGNOSIS — C22.0 HCC (HEPATOCELLULAR CARCINOMA): ICD-10-CM

## 2022-11-07 DIAGNOSIS — I81 PORTAL VEIN THROMBOSIS: ICD-10-CM

## 2022-11-07 DIAGNOSIS — R18.8 CIRRHOSIS OF LIVER WITH ASCITES, UNSPECIFIED HEPATIC CIRRHOSIS TYPE: Primary | ICD-10-CM

## 2022-11-07 DIAGNOSIS — K74.60 CIRRHOSIS OF LIVER WITH ASCITES, UNSPECIFIED HEPATIC CIRRHOSIS TYPE: Primary | ICD-10-CM

## 2022-11-07 PROBLEM — R74.01 TRANSAMINITIS: Status: ACTIVE | Noted: 2022-11-07

## 2022-11-07 LAB
ALBUMIN SERPL BCP-MCNC: 2.7 G/DL (ref 3.5–5.2)
ALP SERPL-CCNC: 315 U/L (ref 55–135)
ALT SERPL W/O P-5'-P-CCNC: 75 U/L (ref 10–44)
ANION GAP SERPL CALC-SCNC: 11 MMOL/L (ref 8–16)
APTT BLDCRRT: 25.8 SEC (ref 21–32)
AST SERPL-CCNC: 124 U/L (ref 10–40)
BASOPHILS # BLD AUTO: 0.03 K/UL (ref 0–0.2)
BASOPHILS NFR BLD: 0.6 % (ref 0–1.9)
BILIRUB SERPL-MCNC: 4 MG/DL (ref 0.1–1)
BNP SERPL-MCNC: 54 PG/ML (ref 0–99)
BUN SERPL-MCNC: 23 MG/DL (ref 8–23)
CALCIUM SERPL-MCNC: 8.8 MG/DL (ref 8.7–10.5)
CHLORIDE SERPL-SCNC: 104 MMOL/L (ref 95–110)
CO2 SERPL-SCNC: 18 MMOL/L (ref 23–29)
CREAT SERPL-MCNC: 1.2 MG/DL (ref 0.5–1.4)
DIFFERENTIAL METHOD: ABNORMAL
EOSINOPHIL # BLD AUTO: 0.2 K/UL (ref 0–0.5)
EOSINOPHIL NFR BLD: 4.2 % (ref 0–8)
ERYTHROCYTE [DISTWIDTH] IN BLOOD BY AUTOMATED COUNT: 14.6 % (ref 11.5–14.5)
EST. GFR  (NO RACE VARIABLE): >60 ML/MIN/1.73 M^2
GLUCOSE SERPL-MCNC: 98 MG/DL (ref 70–110)
HAV IGM SERPL QL IA: NORMAL
HBV CORE IGM SERPL QL IA: NORMAL
HBV SURFACE AG SERPL QL IA: NORMAL
HCT VFR BLD AUTO: 38.7 % (ref 40–54)
HCV AB SERPL QL IA: NORMAL
HGB BLD-MCNC: 12.9 G/DL (ref 14–18)
IMM GRANULOCYTES # BLD AUTO: 0.01 K/UL (ref 0–0.04)
IMM GRANULOCYTES NFR BLD AUTO: 0.2 % (ref 0–0.5)
INR PPP: 1 (ref 0.8–1.2)
LACTATE SERPL-SCNC: 1.3 MMOL/L (ref 0.5–2.2)
LIPASE SERPL-CCNC: 49 U/L (ref 4–60)
LYMPHOCYTES # BLD AUTO: 1.4 K/UL (ref 1–4.8)
LYMPHOCYTES NFR BLD: 28.6 % (ref 18–48)
MAGNESIUM SERPL-MCNC: 2 MG/DL (ref 1.6–2.6)
MCH RBC QN AUTO: 32.7 PG (ref 27–31)
MCHC RBC AUTO-ENTMCNC: 33.3 G/DL (ref 32–36)
MCV RBC AUTO: 98 FL (ref 82–98)
MONOCYTES # BLD AUTO: 0.5 K/UL (ref 0.3–1)
MONOCYTES NFR BLD: 9.9 % (ref 4–15)
NEUTROPHILS # BLD AUTO: 2.8 K/UL (ref 1.8–7.7)
NEUTROPHILS NFR BLD: 56.5 % (ref 38–73)
NRBC BLD-RTO: 0 /100 WBC
PLATELET # BLD AUTO: 147 K/UL (ref 150–450)
PMV BLD AUTO: 10.9 FL (ref 9.2–12.9)
POTASSIUM SERPL-SCNC: 5.1 MMOL/L (ref 3.5–5.1)
PROT SERPL-MCNC: 6.4 G/DL (ref 6–8.4)
PROTHROMBIN TIME: 10.8 SEC (ref 9–12.5)
RBC # BLD AUTO: 3.95 M/UL (ref 4.6–6.2)
SODIUM SERPL-SCNC: 133 MMOL/L (ref 136–145)
TROPONIN I SERPL DL<=0.01 NG/ML-MCNC: 0.01 NG/ML (ref 0–0.03)
TSH SERPL DL<=0.005 MIU/L-ACNC: 2.98 UIU/ML (ref 0.4–4)
WBC # BLD AUTO: 4.96 K/UL (ref 3.9–12.7)

## 2022-11-07 PROCEDURE — 93010 EKG 12-LEAD: ICD-10-PCS | Mod: ,,, | Performed by: INTERNAL MEDICINE

## 2022-11-07 PROCEDURE — 84443 ASSAY THYROID STIM HORMONE: CPT | Performed by: EMERGENCY MEDICINE

## 2022-11-07 PROCEDURE — G0378 HOSPITAL OBSERVATION PER HR: HCPCS

## 2022-11-07 PROCEDURE — 83735 ASSAY OF MAGNESIUM: CPT | Performed by: EMERGENCY MEDICINE

## 2022-11-07 PROCEDURE — 80053 COMPREHEN METABOLIC PANEL: CPT | Performed by: EMERGENCY MEDICINE

## 2022-11-07 PROCEDURE — 63600175 PHARM REV CODE 636 W HCPCS: Performed by: HOSPITALIST

## 2022-11-07 PROCEDURE — 84484 ASSAY OF TROPONIN QUANT: CPT | Performed by: EMERGENCY MEDICINE

## 2022-11-07 PROCEDURE — 80074 ACUTE HEPATITIS PANEL: CPT | Performed by: EMERGENCY MEDICINE

## 2022-11-07 PROCEDURE — 83880 ASSAY OF NATRIURETIC PEPTIDE: CPT | Performed by: EMERGENCY MEDICINE

## 2022-11-07 PROCEDURE — 85025 COMPLETE CBC W/AUTO DIFF WBC: CPT | Performed by: EMERGENCY MEDICINE

## 2022-11-07 PROCEDURE — 99285 EMERGENCY DEPT VISIT HI MDM: CPT | Mod: 25

## 2022-11-07 PROCEDURE — 25500020 PHARM REV CODE 255: Performed by: EMERGENCY MEDICINE

## 2022-11-07 PROCEDURE — 93010 ELECTROCARDIOGRAM REPORT: CPT | Mod: ,,, | Performed by: INTERNAL MEDICINE

## 2022-11-07 PROCEDURE — 83690 ASSAY OF LIPASE: CPT | Performed by: EMERGENCY MEDICINE

## 2022-11-07 PROCEDURE — 96372 THER/PROPH/DIAG INJ SC/IM: CPT | Performed by: HOSPITALIST

## 2022-11-07 PROCEDURE — 83605 ASSAY OF LACTIC ACID: CPT | Performed by: EMERGENCY MEDICINE

## 2022-11-07 PROCEDURE — 85610 PROTHROMBIN TIME: CPT | Performed by: EMERGENCY MEDICINE

## 2022-11-07 PROCEDURE — 85730 THROMBOPLASTIN TIME PARTIAL: CPT | Performed by: EMERGENCY MEDICINE

## 2022-11-07 PROCEDURE — 25000003 PHARM REV CODE 250: Performed by: HOSPITALIST

## 2022-11-07 PROCEDURE — 93005 ELECTROCARDIOGRAM TRACING: CPT

## 2022-11-07 RX ORDER — TALC
6 POWDER (GRAM) TOPICAL NIGHTLY PRN
Status: DISCONTINUED | OUTPATIENT
Start: 2022-11-07 | End: 2022-11-09 | Stop reason: HOSPADM

## 2022-11-07 RX ORDER — HYDROCHLOROTHIAZIDE 25 MG/1
25 TABLET ORAL DAILY
COMMUNITY

## 2022-11-07 RX ORDER — ACETAMINOPHEN 325 MG/1
650 TABLET ORAL EVERY 4 HOURS PRN
Status: DISCONTINUED | OUTPATIENT
Start: 2022-11-07 | End: 2022-11-09 | Stop reason: HOSPADM

## 2022-11-07 RX ORDER — LISINOPRIL 10 MG/1
10 TABLET ORAL DAILY
COMMUNITY

## 2022-11-07 RX ORDER — ONDANSETRON 8 MG/1
8 TABLET, ORALLY DISINTEGRATING ORAL EVERY 8 HOURS PRN
Status: DISCONTINUED | OUTPATIENT
Start: 2022-11-07 | End: 2022-11-09 | Stop reason: HOSPADM

## 2022-11-07 RX ORDER — SODIUM CHLORIDE 0.9 % (FLUSH) 0.9 %
10 SYRINGE (ML) INJECTION EVERY 12 HOURS PRN
Status: DISCONTINUED | OUTPATIENT
Start: 2022-11-07 | End: 2022-11-09 | Stop reason: HOSPADM

## 2022-11-07 RX ORDER — HEPARIN SODIUM 5000 [USP'U]/ML
5000 INJECTION, SOLUTION INTRAVENOUS; SUBCUTANEOUS EVERY 8 HOURS
Status: DISCONTINUED | OUTPATIENT
Start: 2022-11-07 | End: 2022-11-09

## 2022-11-07 RX ADMIN — Medication 6 MG: at 10:11

## 2022-11-07 RX ADMIN — IOHEXOL 100 ML: 350 INJECTION, SOLUTION INTRAVENOUS at 11:11

## 2022-11-07 RX ADMIN — HEPARIN SODIUM 5000 UNITS: 5000 INJECTION, SOLUTION INTRAVENOUS; SUBCUTANEOUS at 10:11

## 2022-11-07 RX ADMIN — ACETAMINOPHEN 650 MG: 325 TABLET ORAL at 10:11

## 2022-11-07 NOTE — Clinical Note
Diagnosis: Shortness of breath [786.05.ICD-9-CM]   Future Attending Provider: JAUN DONALSDON [[id number]]   Admitting Provider:: JAUN DONALDSON [[id number]]

## 2022-11-07 NOTE — PHARMACY MED REC
"    [facility]           Pharmacy  Admission Medication History     The home medication history was taken by Divine Reid.      Medication history obtained from Medications listed below were obtained from: Patient/family    Based on information gathered for medication list, you may go to "Admission" then "Reconcile Home Medications" tabs to review and/or act upon those items.     The home medication list has been updated by the Pharmacy department.   Please read ALL comments highlighted in yellow.   Please address this information as you see fit.    Feel free to contact us if you have any questions or require assistance.    The medications listed below were removed from the home medication list.  Please reorder if appropriate:    Patient reports NOT TAKING the following medication(s):  Tramadol 50mg  Bacitracin oint      No current facility-administered medications on file prior to encounter.     Current Outpatient Medications on File Prior to Encounter   Medication Sig Dispense Refill    hydroCHLOROthiazide (HYDRODIURIL) 25 MG tablet Take 25 mg by mouth once daily.      lisinopriL 10 MG tablet Take 10 mg by mouth once daily.      cetirizine HCl/pseudoephedrine (ZYRTEC-D ORAL) Take by mouth.      GLUC/MICHAEL-MSM#2/C/D3/MARK/BORN (GLUCOSAM-CHONDR-MSM WITH VIT D ORAL) Take by mouth.         Please address this information as you see fit.  Feel free to contact us if you have any questions or require assistance.    Divine Reid  [phone]              .        "

## 2022-11-07 NOTE — ED PROVIDER NOTES
Encounter Date: 11/7/2022       History     Chief Complaint   Patient presents with    Shortness of Breath     Patient arrives for evaluation of SOB with CHANDRA for greater than one month - states not getting any better - denies cough or nausea or diaphoresis - states chest is tight when taking a deep breath - denies leg swelling     This is a generally healthy 82-year-old man who presents for evaluation of 3 months of worsening fatigue dyspnea.  Note intensifying stress at home related to a family member, as result he has stopped his active lifestyle and has become primarily sedentary and now with even minimal exertion is very short of breath. he denies any recent illnesses, fevers, chills, known sick contacts.  He had an episode in which she had some tense chest tightness and discomfort which prompted to come to the emergency department today.  He has not take anything specifically to try and help with this, nothing in particular seems to make it any better, it seems to be progressively worsening with time.    Review of patient's allergies indicates:  No Known Allergies  No past medical history on file.  Past Surgical History:   Procedure Laterality Date    ABDOMINAL SURGERY      CHOLECYSTECTOMY      HERNIA REPAIR      SHOULDER SURGERY       No family history on file.  Social History     Tobacco Use    Smoking status: Former    Smokeless tobacco: Former   Substance Use Topics    Alcohol use: No    Drug use: No     Review of Systems  Constitutional-no fever  HEENT-no congestion  Eyes-no redness  Respiratory-positive shortness of breath  Cardio-positive chest pain  GI-no abdominal pain  Endocrine-no cold intolerance  -no difficulty urinating  MSK-no myalgias  Skin-skin itching  Allergy-no environmental allergy  Neurologic-, no headache  Hematology-no swollen nodes  Behavioral-no confusion  Physical Exam     Initial Vitals [11/07/22 0743]   BP Pulse Resp Temp SpO2   (!) 156/71 78 (!) 24 98.4 °F (36.9 °C) (!) 94 %       MAP       --         Physical Exam  Constitutional:  82-year-old man in no obvious distress  Eyes: Conjunctivae normal.  ENT       Head: Normocephalic, atraumatic.       Nose: Normal external appearance        Mouth/Throat: no strigulous respirations   Hematological/Lymphatic/Immunilogical: no visible lymphadenopathy   Cardiovascular: Normal rate,   Respiratory: Normal respiratory effort.   Gastrointestinal:  Rounded, soft, nontender, no rebound, no guarding  Musculoskeletal: Normal range of motion in all extremities. No obvious deformities or swelling.  Neurologic: Alert, oriented. Normal speech and language. No gross focal neurologic deficits are appreciated.  Skin: Skin is warm, dry. No rash noted.  Psychiatric: Mood and affect are normal.   ED Course   Procedures  Labs Reviewed   CBC W/ AUTO DIFFERENTIAL - Abnormal; Notable for the following components:       Result Value    RBC 3.95 (*)     Hemoglobin 12.9 (*)     Hematocrit 38.7 (*)     MCH 32.7 (*)     RDW 14.6 (*)     Platelets 147 (*)     All other components within normal limits   COMPREHENSIVE METABOLIC PANEL - Abnormal; Notable for the following components:    Sodium 133 (*)     CO2 18 (*)     Albumin 2.7 (*)     Total Bilirubin 4.0 (*)     Alkaline Phosphatase 315 (*)      (*)     ALT 75 (*)     All other components within normal limits   B-TYPE NATRIURETIC PEPTIDE   LACTIC ACID, PLASMA   LIPASE   MAGNESIUM   TROPONIN I   PROTIME-INR   APTT   HEPATITIS PANEL, ACUTE        ECG Results              EKG 12-lead (In process)  Result time 11/07/22 13:14:11      In process by Interface, Lab In [facility] (11/07/22 13:14:11)                   Narrative:    Test Reason : R06.02,    Vent. Rate : 072 BPM     Atrial Rate : 072 BPM     P-R Int : 248 ms          QRS Dur : 102 ms      QT Int : 406 ms       P-R-T Axes : 012 085 007 degrees     QTc Int : 444 ms    Sinus rhythm with 1st degree A-V block  Incomplete right bundle branch block  Septal infarct ,age  undetermined  Abnormal ECG  When compared with ECG of 31-OCT-2019 08:03,  Incomplete right bundle branch block is now Present  Septal infarct is now Present    Referred By: [email or web address]   SELF           Confirmed By:                                      EKG 12-lead (Preliminary result)  Result time 11/07/22 09:31:42      ED Interpretation by Daniel Cope MD (11/07/22 09:31:42, [facility] Emergency Dept, Emergency Medicine)    My EKG interpretation, sinus rhythm, 72 beats per minute, normal axis, mild T-wave depression 1, T-wave inversion 3, poor R-wave progression when compared to previous EKG 10 /31/2019 poor R-wave progession is new                                  Imaging Results               US Liver with Doppler (xpd) (Final result)  Result time 11/07/22 15:36:55      Final result by Alessio Holland MD (11/07/22 15:36:55)                   Impression:      In correlation with same day CT, findings concerning for at least partially thrombosed intrahepatic main portal vein near the confluence and right portal vein (with reversed flow).    Cirrhosis with sequela of portal hypertension to include splenomegaly and mild upper abdominal ascites.  Indeterminate lesion in the right hepatic lobe.  Suggest further correlation with nonemergent liver protocol CT or MRI.    This report was flagged in Epic as abnormal.      Electronically signed by: Alessio Holland  Date:    11/07/2022  Time:    15:36               Narrative:    EXAMINATION:  US LIVER WITH DOPPLER    CLINICAL HISTORY:  liver injury;    TECHNIQUE:  Duplex scan of the liver was performed using B-mode/gray scale imaging and Doppler spectral analysis and color flow.    COMPARISON:  CT abdomen pelvis with contrast dated same day.    FINDINGS:  Liver measures at the upper limits of normal in size measuring 16.4 cm with coarsened echotexture throughout.  Measured hypoechoic area in the right lobe at 1.9 x 2.0 x 1.4 cm with possible mild peripheral  vascularity.  The gallbladder is absent.  Extrahepatic duct measures 0.3 cm.  No intrahepatic biliary dilatation.  Spleen is enlarged measuring 15.6 cm with splenule noted.  Visualized pancreas is unremarkable.  Mild perihepatic ascites.    SMV appears patent.    Patent antegrade flow in the left portal vein.  Limited color visualization with intraluminal heterogeneity at the main portal vein and right portal vein concerning for thrombus with partial reversed flow at the right portal vein.    Hepatic arterial system is patent.    The middle, right, and left hepatic veins appear patent.    IVC is patent.  Splenic vasculature appears patent at the hilum.                                       CT Abdomen Pelvis With Contrast (Final result)  Result time 11/07/22 11:40:53      Final result by Justin Devries MD (11/07/22 11:40:53)                   Impression:      1.  Nodular contour of the liver along with perihepatic fluid, suggestive of cirrhosis with associated ascites.  Dedicated ultrasound of the liver may be obtained for further evaluation.    2.  Splenomegaly, probably sequela of portal hypertension given the cirrhotic liver.    3.  Diverticulosis coli without evidence of acute diverticulitis.    4.  Nonobstructing left-sided nephrolithiasis.    5.  Additional findings as above.      Electronically signed by: Justin Devries MD  Date:    11/07/2022  Time:    11:40               Narrative:    EXAMINATION:  CT ABDOMEN PELVIS WITH CONTRAST    CLINICAL HISTORY:  Epigastric pain;    TECHNIQUE:  Low dose axial images, sagittal and coronal reformations were obtained from the lung bases to the pubic symphysis following the IV administration of 100 mL of Omnipaque 350 .  Oral contrast was not given.    COMPARISON:  None.    FINDINGS:  There are no pleural effusions.  There is no evidence of a pneumothorax.  No airspace opacity is present.  No pulmonary nodules identified.  There are chronic interstitial findings in the lung  bases.    The heart is unremarkable. There are coronary artery calcifications present.  There is normal tapering of the abdominal aorta.  There are extensive calcifications along the course of the abdominal aorta and its branch vessels.  The celiac trunk, the SMA, and CHANELLE remain patent.  There are multiple splenic collateral vessels.    There is no evidence of lymphadenopathy in the abdomen or pelvis.    There is a small hiatal hernia.  The distal portion of the stomach is unremarkable.  The duodenum is within normal limits.  The small bowel loops are unremarkable.  The appendix is unremarkable.  There is colonic diverticula without evidence of acute diverticulitis.    There is nodular contour of the liver, suggestive of hepatic cirrhosis.  No discrete hepatic mass identified.  There is perihepatic ascites.    The gallbladder is not visualized and may be surgically absent.  The biliary tree is unremarkable.  The spleen is enlarged.  There is fatty atrophy involving the pancreatic head.  The adrenal glands are unremarkable.    There is bilateral perinephric stranding with right greater than left.  There is a 3 mm nonobstructing stone in the lower pole the left kidney.  The urinary bladder is unremarkable.  There are calcifications in the prostate gland.    The small amount of free fluid the pelvis in addition the perihepatic ascites.  There is no evidence of free air there is no evidence of pneumatosis.  No portal venous air is identified.    The psoas margins are unremarkable.  There are bilateral fat containing inguinal hernias.  There are degenerative changes in the osseous structures.  There is no evidence of a fracture.                                       X-Ray Chest AP Portable (Final result)  Result time 11/07/22 09:10:36      Final result by Justin Devries MD (11/07/22 09:10:36)                   Impression:      No acute process.      Electronically signed by: Justin Devries  MD  Date:    11/07/2022  Time:    09:10               Narrative:    EXAMINATION:  XR CHEST AP PORTABLE    CLINICAL HISTORY:  Shortness of breath    TECHNIQUE:  Single frontal view of the chest was performed.    COMPARISON:  None    FINDINGS:  Monitoring EKG leads are present.  The trachea is unremarkable.  The cardiomediastinal silhouette is within normal limits.  The hilar structures are unremarkable.  There is no evidence of free air beneath the hemidiaphragms.  There are no pleural effusions.  There is no evidence of a pneumothorax.  There is no evidence of pneumomediastinum.  There is bibasilar subsegmental atelectasis.  There is no focal consolidation.  There are degenerative changes in the osseous structures.                                       Medications   sodium chloride 0.9% flush 10 mL (has no administration in time range)   heparin (porcine) injection 5,000 Units (has no administration in time range)   acetaminophen tablet 650 mg (has no administration in time range)   ondansetron disintegrating tablet 8 mg (has no administration in time range)   melatonin tablet 6 mg (has no administration in time range)   iohexoL (OMNIPAQUE 350) injection 100 mL (100 mLs Intravenous Given 11/7/22 1100)     Medical Decision Making:   History:   Old Medical Records: I decided to obtain old medical records.  Old Records Summarized: records from clinic visits and records from previous admission(s).  Differential Diagnosis:   Anemia, electrolyte derangement, heart failure, neoplasm, deconditioning  Clinical Tests:   Lab Tests: Ordered and Reviewed  Radiological Study: Ordered and Reviewed  Medical Tests: Ordered and Reviewed  ED Management:  81 yo with abdominal pain.   Labs remarkable for elevated LFTs, elevated T bili, concern for liver failure cirrhosis he is adamant that he is not a drinker at this time does endorse some Tylenol use 2 weeks prior.    CT imaging of the abdomen is consistent with potential early  cirrhosis, previous labs demonstrated mild elevation in liver function test but not this profound.    ultrasound demonstrates what appears to be a thrombus in the portal system.  Discussed the case with on-call GI, plan for observation further testing and workup for hypercoagulable state labs some or liver issue.  Will plan for irritation, ongoing monitoring.--                        Clinical Impression:   Final diagnoses:  [R06.02] Shortness of breath  [R17] Total bilirubin, elevated (Primary)  [I81] Portal vein thrombosis        ED Disposition Condition    Observation Stable                Daniel Cope MD  11/07/22 0355

## 2022-11-07 NOTE — ED NOTES
Pt. Complains of SOB on exertion and chest pain on inspiration for the last month. Pt. Denies SOB or chest pain at this time. Pt. Appears comfortable and in no distress. EKG done and given to Dr. Cope. Pt. On continuous cardiac monitoring and pulse OX.

## 2022-11-08 ENCOUNTER — PATIENT MESSAGE (OUTPATIENT)
Dept: PRIMARY CARE CLINIC | Facility: CLINIC | Age: 82
End: 2022-11-08
Payer: MEDICARE

## 2022-11-08 ENCOUNTER — TELEPHONE (OUTPATIENT)
Dept: TRANSPLANT | Facility: CLINIC | Age: 82
End: 2022-11-08
Payer: MEDICARE

## 2022-11-08 PROBLEM — C22.0 PORTAL VEIN THROMBOSIS SECONDARY TO HCC INVASION: Status: ACTIVE | Noted: 2022-11-07

## 2022-11-08 PROBLEM — R16.0 LIVER MASS: Status: ACTIVE | Noted: 2022-11-08

## 2022-11-08 PROBLEM — C22.0 HCC (HEPATOCELLULAR CARCINOMA): Status: ACTIVE | Noted: 2022-11-08

## 2022-11-08 PROBLEM — R18.8 CIRRHOSIS OF LIVER WITH ASCITES: Status: ACTIVE | Noted: 2022-11-07

## 2022-11-08 LAB
AFP SERPL-MCNC: 3003 NG/ML (ref 0–8.4)
ALBUMIN FLD-MCNC: 0.9 G/DL
ALBUMIN SERPL BCP-MCNC: 2.2 G/DL (ref 3.5–5.2)
ALP SERPL-CCNC: 265 U/L (ref 55–135)
ALT SERPL W/O P-5'-P-CCNC: 64 U/L (ref 10–44)
ANION GAP SERPL CALC-SCNC: 11 MMOL/L (ref 8–16)
APPEARANCE FLD: NORMAL
AST SERPL-CCNC: 100 U/L (ref 10–40)
BILIRUB DIRECT SERPL-MCNC: 2.3 MG/DL (ref 0.1–0.3)
BILIRUB SERPL-MCNC: 4.3 MG/DL (ref 0.1–1)
BODY FLD TYPE: NORMAL
BODY FLUID SOURCE, LDH: NORMAL
BUN SERPL-MCNC: 28 MG/DL (ref 8–23)
CALCIUM SERPL-MCNC: 8.6 MG/DL (ref 8.7–10.5)
CHLORIDE SERPL-SCNC: 105 MMOL/L (ref 95–110)
CO2 SERPL-SCNC: 19 MMOL/L (ref 23–29)
COLOR FLD: YELLOW
CREAT SERPL-MCNC: 1.3 MG/DL (ref 0.5–1.4)
EST. GFR  (NO RACE VARIABLE): 55 ML/MIN/1.73 M^2
GLUCOSE SERPL-MCNC: 117 MG/DL (ref 70–110)
GRAM STN SPEC: NORMAL
GRAM STN SPEC: NORMAL
LDH FLD L TO P-CCNC: 62 U/L
LYMPHOCYTES NFR FLD MANUAL: 81 %
MONOS+MACROS NFR FLD MANUAL: 15 %
NEUTROPHILS NFR FLD MANUAL: 4 %
POTASSIUM SERPL-SCNC: 4.1 MMOL/L (ref 3.5–5.1)
PROT FLD-MCNC: 1.4 G/DL
PROT SERPL-MCNC: 5.4 G/DL (ref 6–8.4)
SODIUM SERPL-SCNC: 135 MMOL/L (ref 136–145)
SPECIMEN SOURCE: NORMAL
SPECIMEN SOURCE: NORMAL
WBC # FLD: 1251 /CU MM

## 2022-11-08 PROCEDURE — 88342 IMHCHEM/IMCYTCHM 1ST ANTB: CPT | Mod: 26,,, | Performed by: PATHOLOGY

## 2022-11-08 PROCEDURE — 80053 COMPREHEN METABOLIC PANEL: CPT | Performed by: HOSPITALIST

## 2022-11-08 PROCEDURE — 88112 PR  CYTOPATH, CELL ENHANCE TECH: ICD-10-PCS | Mod: 26,,, | Performed by: PATHOLOGY

## 2022-11-08 PROCEDURE — 87070 CULTURE OTHR SPECIMN AEROBIC: CPT | Performed by: HOSPITALIST

## 2022-11-08 PROCEDURE — 25000003 PHARM REV CODE 250: Performed by: RADIOLOGY

## 2022-11-08 PROCEDURE — 63600175 PHARM REV CODE 636 W HCPCS: Performed by: HOSPITALIST

## 2022-11-08 PROCEDURE — 82105 ALPHA-FETOPROTEIN SERUM: CPT | Performed by: HOSPITALIST

## 2022-11-08 PROCEDURE — 84157 ASSAY OF PROTEIN OTHER: CPT | Performed by: HOSPITALIST

## 2022-11-08 PROCEDURE — 99215 PR OFFICE/OUTPT VISIT, EST, LEVL V, 40-54 MIN: ICD-10-PCS | Mod: 95,,, | Performed by: INTERNAL MEDICINE

## 2022-11-08 PROCEDURE — 36415 COLL VENOUS BLD VENIPUNCTURE: CPT | Performed by: HOSPITALIST

## 2022-11-08 PROCEDURE — 96372 THER/PROPH/DIAG INJ SC/IM: CPT | Mod: 59 | Performed by: HOSPITALIST

## 2022-11-08 PROCEDURE — 88112 CYTOPATH CELL ENHANCE TECH: CPT | Performed by: PATHOLOGY

## 2022-11-08 PROCEDURE — 82042 OTHER SOURCE ALBUMIN QUAN EA: CPT | Performed by: HOSPITALIST

## 2022-11-08 PROCEDURE — 25500020 PHARM REV CODE 255: Performed by: HOSPITALIST

## 2022-11-08 PROCEDURE — 99215 OFFICE O/P EST HI 40 MIN: CPT | Mod: 95,,, | Performed by: INTERNAL MEDICINE

## 2022-11-08 PROCEDURE — G0378 HOSPITAL OBSERVATION PER HR: HCPCS

## 2022-11-08 PROCEDURE — 25000003 PHARM REV CODE 250: Performed by: INTERNAL MEDICINE

## 2022-11-08 PROCEDURE — 82248 BILIRUBIN DIRECT: CPT | Performed by: HOSPITALIST

## 2022-11-08 PROCEDURE — A9585 GADOBUTROL INJECTION: HCPCS | Performed by: HOSPITALIST

## 2022-11-08 PROCEDURE — 88305 TISSUE EXAM BY PATHOLOGIST: CPT | Performed by: PATHOLOGY

## 2022-11-08 PROCEDURE — 88305 TISSUE EXAM BY PATHOLOGIST: ICD-10-PCS | Mod: 26,,, | Performed by: PATHOLOGY

## 2022-11-08 PROCEDURE — 89051 BODY FLUID CELL COUNT: CPT | Performed by: HOSPITALIST

## 2022-11-08 PROCEDURE — 87205 SMEAR GRAM STAIN: CPT | Performed by: HOSPITALIST

## 2022-11-08 PROCEDURE — 88342 IMHCHEM/IMCYTCHM 1ST ANTB: CPT | Performed by: PATHOLOGY

## 2022-11-08 PROCEDURE — 88341 IMHCHEM/IMCYTCHM EA ADD ANTB: CPT | Mod: 26,,, | Performed by: PATHOLOGY

## 2022-11-08 PROCEDURE — 88112 CYTOPATH CELL ENHANCE TECH: CPT | Mod: 26,,, | Performed by: PATHOLOGY

## 2022-11-08 PROCEDURE — 94761 N-INVAS EAR/PLS OXIMETRY MLT: CPT

## 2022-11-08 PROCEDURE — 88341 PR IHC OR ICC EACH ADD'L SINGLE ANTIBODY  STAINPR: ICD-10-PCS | Mod: 26,,, | Performed by: PATHOLOGY

## 2022-11-08 PROCEDURE — 88341 IMHCHEM/IMCYTCHM EA ADD ANTB: CPT | Performed by: PATHOLOGY

## 2022-11-08 PROCEDURE — 88305 TISSUE EXAM BY PATHOLOGIST: CPT | Mod: 26,,, | Performed by: PATHOLOGY

## 2022-11-08 PROCEDURE — 87075 CULTR BACTERIA EXCEPT BLOOD: CPT | Performed by: HOSPITALIST

## 2022-11-08 PROCEDURE — 99220 PR INITIAL OBSERVATION CARE,LEVL III: ICD-10-PCS | Mod: GC,,, | Performed by: INTERNAL MEDICINE

## 2022-11-08 PROCEDURE — 83615 LACTATE (LD) (LDH) ENZYME: CPT | Performed by: HOSPITALIST

## 2022-11-08 PROCEDURE — 99220 PR INITIAL OBSERVATION CARE,LEVL III: CPT | Mod: GC,,, | Performed by: INTERNAL MEDICINE

## 2022-11-08 PROCEDURE — 88342 CHG IMMUNOCYTOCHEMISTRY: ICD-10-PCS | Mod: 26,,, | Performed by: PATHOLOGY

## 2022-11-08 RX ORDER — GADOBUTROL 604.72 MG/ML
10 INJECTION INTRAVENOUS
Status: COMPLETED | OUTPATIENT
Start: 2022-11-08 | End: 2022-11-08

## 2022-11-08 RX ORDER — URSODIOL 300 MG/1
300 CAPSULE ORAL 2 TIMES DAILY
Status: DISCONTINUED | OUTPATIENT
Start: 2022-11-08 | End: 2022-11-09 | Stop reason: HOSPADM

## 2022-11-08 RX ORDER — LIDOCAINE HYDROCHLORIDE 10 MG/ML
INJECTION INFILTRATION; PERINEURAL
Status: COMPLETED | OUTPATIENT
Start: 2022-11-08 | End: 2022-11-08

## 2022-11-08 RX ADMIN — HEPARIN SODIUM 5000 UNITS: 5000 INJECTION, SOLUTION INTRAVENOUS; SUBCUTANEOUS at 05:11

## 2022-11-08 RX ADMIN — GADOBUTROL 10 ML: 604.72 INJECTION INTRAVENOUS at 11:11

## 2022-11-08 RX ADMIN — URSODIOL 300 MG: 300 CAPSULE ORAL at 09:11

## 2022-11-08 RX ADMIN — LIDOCAINE HYDROCHLORIDE 5 ML: 10 INJECTION, SOLUTION INFILTRATION; PERINEURAL at 09:11

## 2022-11-08 NOTE — H&P
[facility] Medicine  History & Physical    Patient Name: Corey Esquivel  MRN: [id number]  Patient Class: OP- Observation  Admission Date: 11/7/2022  Attending Physician: Santana Richard MD   Primary Care Provider: Primary Doctor No         Patient information was obtained from patient and ER records.     Subjective:     Principal Problem:Portal vein thrombosis    Chief Complaint:   Chief Complaint   Patient presents with    Shortness of Breath     Patient arrives for evaluation of SOB with CHANDRA for greater than one month - states not getting any better - denies cough or nausea or diaphoresis - states chest is tight when taking a deep breath - denies leg swelling        HPI: Corey Esquivel is pleasant 81 yo male with no significant medical history who presents to ED complaining of worseing short of breath, CHANDRA and chest tightness for 2-3 months. He stated that he gets tired easily. 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 He feels uncomfortable when takes deep breath. He also complains diffuse itching all over body that started few weeks ago.   He denies fever, weight changes, constipation, diarrhea, blood in stool or urine.     On admission, his vital signs are unremarkable.   Lab results are remarkable for elevated total bili 4.0, elevated alk phosphatase 315 and elevtaed , and ALT 75. Negative initial Trop. Bnp wnl.     Liver US with doppler shows findings concerning for at least partially thrombosed intrahepatic main portal vein near the confluence and right portal vein (with reversed flow). Cirrhosis with sequela of portal hypertension to include splenomegaly and mild upper abdominal ascites.  Indeterminate lesion in the right hepatic lobe    Cxr shows no acute pulmonary abnorlaity   EKG show sinus rhythm with new incomplete rbbb and criteria for septal infarct, age undetermine.       No past medical history on file.    Past Surgical History:    Procedure Laterality Date    ABDOMINAL SURGERY      CHOLECYSTECTOMY      HERNIA REPAIR      SHOULDER SURGERY         Review of patient's allergies indicates:  No Known Allergies    No current facility-administered medications on file prior to encounter.     Current Outpatient Medications on File Prior to Encounter   Medication Sig    hydroCHLOROthiazide (HYDRODIURIL) 25 MG tablet Take 25 mg by mouth once daily.    lisinopriL 10 MG tablet Take 10 mg by mouth once daily.    cetirizine HCl/pseudoephedrine (ZYRTEC-D ORAL) Take by mouth.    GLUC/MICHAEL-MSM#2/C/D3/MARK/BORN (GLUCOSAM-CHONDR-MSM WITH VIT D ORAL) Take by mouth.    [DISCONTINUED] bacitracin 500 unit/gram Oint Apply topically 2 (two) times daily.    [DISCONTINUED] traMADoL (ULTRAM) 50 mg tablet Take 1 tablet (50 mg total) by mouth every 12 (twelve) hours as needed for Pain.     Family History    None       Tobacco Use    Smoking status: Former    Smokeless tobacco: Former   Substance and Sexual Activity    Alcohol use: No    Drug use: No    Sexual activity: Not on file     Review of Systems   Constitutional:  Positive for activity change and fatigue.   HENT: Negative.     Eyes: Negative.    Respiratory:  Positive for chest tightness and shortness of breath.    Cardiovascular: Negative.    Gastrointestinal:  Positive for abdominal distention.   Endocrine: Negative.    Genitourinary: Negative.    Musculoskeletal: Negative.    Allergic/Immunologic: Negative.    Neurological: Negative.    Hematological: Negative.    Psychiatric/Behavioral: Negative.     Objective:     Vital Signs (Most Recent):  Temp: 96.6 °F (35.9 °C) (11/07/22 2055)  Pulse: 71 (11/07/22 2055)  Resp: (!) 21 (11/07/22 1905)  BP: (!) 146/70 (11/07/22 2055)  SpO2: 98 % (11/07/22 2055)   Vital Signs (24h Range):  Temp:  [96.6 °F (35.9 °C)-98.4 °F (36.9 °C)] 96.6 °F (35.9 °C)  Pulse:  [55-78] 71  Resp:  [14-32] 21  SpO2:  [94 %-98 %] 98 %  BP: (135-166)/(68-79) 146/70     Weight: 117.9  kg (260 lb)  Body mass index is 31.65 kg/m².    Physical Exam  Constitutional:       Appearance: Normal appearance.   HENT:      Head: Normocephalic and atraumatic.      Nose: Nose normal.   Eyes:      Extraocular Movements: Extraocular movements intact.      Pupils: Pupils are equal, round, and reactive to light.   Cardiovascular:      Rate and Rhythm: Normal rate and regular rhythm.      Heart sounds: Normal heart sounds.   Pulmonary:      Effort: Pulmonary effort is normal.      Breath sounds: Normal breath sounds.   Abdominal:      General: There is distension.      Tenderness: There is no abdominal tenderness. There is no right CVA tenderness, left CVA tenderness, guarding or rebound.   Musculoskeletal:         General: Normal range of motion.      Cervical back: Normal range of motion.   Skin:     General: Skin is warm and dry.   Neurological:      General: No focal deficit present.      Mental Status: He is alert.   Psychiatric:         Mood and Affect: Mood normal.         Behavior: Behavior normal.         CRANIAL NERVES     CN III, IV, VI   Pupils are equal, round, and reactive to light.     Significant Labs: All pertinent labs within the past 24 hours have been reviewed.  CBC:   Recent Labs   Lab 11/07/22  0926   WBC 4.96   HGB 12.9*   HCT 38.7*   *     CMP:   Recent Labs   Lab 11/07/22  0926   *   K 5.1      CO2 18*   GLU 98   BUN 23   CREATININE 1.2   CALCIUM 8.8   PROT 6.4   ALBUMIN 2.7*   BILITOT 4.0*   ALKPHOS 315*   *   ALT 75*   ANIONGAP 11     Cardiac Markers:   Recent Labs   Lab 11/07/22  0926   BNP 54     Magnesium:   Recent Labs   Lab 11/07/22  0926   MG 2.0     Troponin:   Recent Labs   Lab 11/07/22  0926   TROPONINI 0.015     TSH: No results for input(s): TSH in the last 4320 hours.    Significant Imaging: I have reviewed all pertinent imaging results/findings within the past 24 hours.    Assessment/Plan:     * Portal vein thrombosis  Start heparin prophylaxis dose  for now.   MRCP in am   GI consult in       Transaminitis  Unclear etiology. No history of alcohol use per patient and family.   Acute hepatitis panel in process.   GI consult       Liver cirrhosis  unclear etiology. GI consult and MRCP in am.       Mixed hyperlipidemia  Currently not on medication.         VTE Risk Mitigation (From admission, onward)         Ordered     heparin (porcine) injection 5,000 Units  Every 8 hours         11/07/22 1740     IP VTE HIGH RISK PATIENT  Once         11/07/22 1740     Place sequential compression device  Until discontinued         11/07/22 1740               Code Status: full code    Observation status due to anticipated length of stay < 2 overnight.     Calderon Doe MD  Department of Hospital Medicine   [facility]   Patent

## 2022-11-08 NOTE — CONSULTS
[facility] - Telemetry  Hematology/Oncology  Consult Note    Patient Name: Corey Esquivel  MRN: [id number]  Admission Date: 11/7/2022  Hospital Length of Stay: 0 days  Code Status: Full Code   Attending Provider: Richard Wilson, *  Consulting Provider: Quinn Small MD  Primary Care Physician: Niecy Davis MD  Principal Problem:Portal vein thrombosis    Inpatient consult to Telemedicine - Oncology  Consult performed by: Quinn Small MD  Consult ordered by: Quinn Small MD  Reason for consult: liver mass        Subjective:     HPI:  82 year-old male was admitted on 11/7/22 for portal vein thrombosis/cirrhosis/hyperbilirubinemia. Imaging is concerning for hepatocellular carcinoma. Consult is for hepatocellular carcinoma.      VIRTUAL TELENOTE    Start time:3:00pm   Chief complaint: liver cancer  The patient location is: [facility]  The patient arrived at: 3:00pm  Present with the patient at the time of the telemed/virtual assessment: his wife    End time:  3:15 pm    Total time spent with patient: 15 minutes    The attending portion of this evaluation, treatment, and documentation was performed per Quinn Small MD via Telemedicine AudioVisual using the secure Get[employer] software platform with 2 way audio/video. The provider was located off-site and the patient is located in the hospital. The aforementioned video software was utilized to document the relevant history and physical exam.    - he endorses fatigue, dyspnea upon exertion, abdominal pain/distention.        Oncology Treatment Plan:   [No matching plan found]    Medications:  Continuous Infusions:  Scheduled Meds:   heparin (porcine)  5,000 Units Subcutaneous Q8H     PRN Meds:acetaminophen, melatonin, ondansetron, sodium chloride 0.9%     Review of patient's allergies indicates:  No Known Allergies     No past medical history on file.  Past Surgical History:   Procedure Laterality Date    ABDOMINAL SURGERY      CHOLECYSTECTOMY      HERNIA REPAIR       SHOULDER SURGERY       Family History    None       Tobacco Use    Smoking status: Former    Smokeless tobacco: Former   Substance and Sexual Activity    Alcohol use: No    Drug use: No    Sexual activity: Not on file       Review of Systems   Constitutional:  Positive for fatigue.   HENT:  Negative for sore throat.    Eyes:  Negative for visual disturbance.   Respiratory:  Positive for shortness of breath.    Cardiovascular:  Negative for chest pain.   Gastrointestinal:  Positive for abdominal distention and abdominal pain.   Genitourinary:  Negative for dysuria.   Musculoskeletal:  Negative for back pain.   Skin:  Negative for rash.   Neurological:  Positive for weakness. Negative for headaches.   Hematological:  Negative for adenopathy.   Psychiatric/Behavioral:  The patient is not nervous/anxious.    Objective:     Vital Signs (Most Recent):  Temp: 96.3 °F (35.7 °C) (11/08/22 0759)  Pulse: 65 (11/08/22 0952)  Resp: 15 (11/08/22 0952)  BP: (!) 164/72 (11/08/22 0952)  SpO2: 96 % (11/08/22 1200)   Vital Signs (24h Range):  Temp:  [96.3 °F (35.7 °C)-98.6 °F (37 °C)] 96.3 °F (35.7 °C)  Pulse:  [58-71] 65  Resp:  [15-21] 15  SpO2:  [93 %-98 %] 96 %  BP: (113-172)/(58-77) 164/72     Weight: 118.3 kg (260 lb 12.9 oz)  Body mass index is 31.75 kg/m².  Body surface area is 2.52 meters squared.      Intake/Output Summary (Last 24 hours) at 11/8/2022 1557  Last data filed at 11/8/2022 0959  Gross per 24 hour   Intake --   Output 45 ml   Net -45 ml       Physical Exam  Deferred due to telemedicine visit.    Significant Labs:   CBC:   Recent Labs   Lab 11/07/22 0926   WBC 4.96   HGB 12.9*   HCT 38.7*   *    and CMP:   Recent Labs   Lab 11/07/22 0926 11/08/22  0331   * 135*   K 5.1 4.1    105   CO2 18* 19*   GLU 98 117*   BUN 23 28*   CREATININE 1.2 1.3   CALCIUM 8.8 8.6*   PROT 6.4 5.4*   ALBUMIN 2.7* 2.2*   BILITOT 4.0* 4.3*   ALKPHOS 315* 265*   * 100*   ALT 75* 64*   ANIONGAP 11 11        Diagnostic Results:  MRI abdomen (11/8/22): I have personally reviewed the images  Cirrhosis and stigmata of portal hypertension, with suspected HCC in the posterior right hepatic lobe.     Acute thrombosis of the intrahepatic right portal vein and attenuation of left portal vein intrahepatic component may be related to chronic thrombosis or mass effect related to surrounding fibrosis.     Diffusely altered hepatic perfusion, perhaps related to the portal venous abnormalities versus infiltrative neoplasm.      Assessment/Plan:     Liver mass  - MRI abdomen (11/8/22) reveals a 3.5cm mass in the right lobe of the liver, concerning for hepatocellular carcinoma.  - I have spoken with interventional radiology. The concern is that he may potentially have multifocal hepatocellular carcinoma in the left lobe of the liver.   - his Child-Young score is C (10 points). From this alone he is not a candidate for systemic therapy.  - I will start actigall and see if that helps his hyperbilirubinemia. It does not appear he has significant ductal dilation, so unclear if ERCP would be considered/helpful.  - recommend palliative care consult  - follow up AFP. If normal, perhaps consideration for biopsy to confirm the diagnosis of hepatocellular carcinoma.  - may be appropriate to keep patient in hospital while clarifying the above issues. If hyperbilirubinemia cannot be fixed, his life expectancy is likely measured in weeks/months, and hospice would be appropriate.        Thank you for your consult.     Quinn Small MD  Hematology/Oncology  [place] - Telemetry

## 2022-11-08 NOTE — PROCEDURES
Radiology Post-Procedure Note    Pre Op Diagnosis: Ascites    Post Op Diagnosis: Same    Procedure: US guided paracentesis.    Procedure performed by: Ken Jean MD    Written Informed Consent Obtained: Yes  Specimen Removed: YES 60 cc serous fluid  Estimated Blood Loss: Minimal    Findings:   Successful RUQ paracentesis.  Albumin administered per protocol. No complications.    Patient tolerated procedure well.    Ken Jean M.D.  Diagnostic and Interventional Radiologist  Department of Radiology  Pager: [phone]

## 2022-11-08 NOTE — ASSESSMENT & PLAN NOTE
- concerning on MRI  - AFP pending  - unclear if candidate for chemo  - discuss with GI whether ERCP would be helpful in lowering bilirubin  - started actigall  - may need biopsy if AFP wnl  - Palliative care consult

## 2022-11-08 NOTE — PLAN OF CARE
Problem: Adult Inpatient Plan of Care  Goal: Plan of Care Review  11/8/2022 0104 by Kinjal Fish, MICHELLE  Outcome: Ongoing, Progressing  11/8/2022 0017 by Kinjal Fish RN  Outcome: Ongoing, Progressing     Problem: Fall Injury Risk  Goal: Absence of Fall and Fall-Related Injury  11/8/2022 0104 by Kinjal Fish RN  Outcome: Ongoing, Progressing  11/8/2022 0017 by Kinjal Fish RN  Outcome: Ongoing, Progressing     Problem: Fatigue  Goal: Improved Activity Tolerance  Outcome: Ongoing, Progressing

## 2022-11-08 NOTE — SUBJECTIVE & OBJECTIVE
VIRTUAL TELENOTE    Start time:3:00pm   Chief complaint: liver cancer  The patient location is: [facility]  The patient arrived at: 3:00pm  Present with the patient at the time of the telemed/virtual assessment: his wife    End time:  3:15 pm    Total time spent with patient: 15 minutes    The attending portion of this evaluation, treatment, and documentation was performed per Quinn Small MD via Telemedicine AudioVisual using the secure [employer] software platform with 2 way audio/video. The provider was located off-site and the patient is located in the hospital. The aforementioned video software was utilized to document the relevant history and physical exam.    - he endorses fatigue, dyspnea upon exertion, abdominal pain/distention.        Oncology Treatment Plan:   [No matching plan found]    Medications:  Continuous Infusions:  Scheduled Meds:   heparin (porcine)  5,000 Units Subcutaneous Q8H     PRN Meds:acetaminophen, melatonin, ondansetron, sodium chloride 0.9%     Review of patient's allergies indicates:  No Known Allergies     No past medical history on file.  Past Surgical History:   Procedure Laterality Date    ABDOMINAL SURGERY      CHOLECYSTECTOMY      HERNIA REPAIR      SHOULDER SURGERY       Family History    None       Tobacco Use    Smoking status: Former    Smokeless tobacco: Former   Substance and Sexual Activity    Alcohol use: No    Drug use: No    Sexual activity: Not on file       Review of Systems   Constitutional:  Positive for fatigue.   HENT:  Negative for sore throat.    Eyes:  Negative for visual disturbance.   Respiratory:  Positive for shortness of breath.    Cardiovascular:  Negative for chest pain.   Gastrointestinal:  Positive for abdominal distention and abdominal pain.   Genitourinary:  Negative for dysuria.   Musculoskeletal:  Negative for back pain.   Skin:  Negative for rash.   Neurological:  Positive for weakness. Negative for headaches.   Hematological:  Negative for adenopathy.    Psychiatric/Behavioral:  The patient is not nervous/anxious.    Objective:     Vital Signs (Most Recent):  Temp: 96.3 °F (35.7 °C) (11/08/22 0759)  Pulse: 65 (11/08/22 0952)  Resp: 15 (11/08/22 0952)  BP: (!) 164/72 (11/08/22 0952)  SpO2: 96 % (11/08/22 1200)   Vital Signs (24h Range):  Temp:  [96.3 °F (35.7 °C)-98.6 °F (37 °C)] 96.3 °F (35.7 °C)  Pulse:  [58-71] 65  Resp:  [15-21] 15  SpO2:  [93 %-98 %] 96 %  BP: (113-172)/(58-77) 164/72     Weight: 118.3 kg (260 lb 12.9 oz)  Body mass index is 31.75 kg/m².  Body surface area is 2.52 meters squared.      Intake/Output Summary (Last 24 hours) at 11/8/2022 1557  Last data filed at 11/8/2022 0959  Gross per 24 hour   Intake --   Output 45 ml   Net -45 ml       Physical Exam  Deferred due to telemedicine visit.    Significant Labs:   CBC:   Recent Labs   Lab 11/07/22 0926   WBC 4.96   HGB 12.9*   HCT 38.7*   *    and CMP:   Recent Labs   Lab 11/07/22 0926 11/08/22  0331   * 135*   K 5.1 4.1    105   CO2 18* 19*   GLU 98 117*   BUN 23 28*   CREATININE 1.2 1.3   CALCIUM 8.8 8.6*   PROT 6.4 5.4*   ALBUMIN 2.7* 2.2*   BILITOT 4.0* 4.3*   ALKPHOS 315* 265*   * 100*   ALT 75* 64*   ANIONGAP 11 11       Diagnostic Results:  MRI abdomen (11/8/22): I have personally reviewed the images  Cirrhosis and stigmata of portal hypertension, with suspected HCC in the posterior right hepatic lobe.     Acute thrombosis of the intrahepatic right portal vein and attenuation of left portal vein intrahepatic component may be related to chronic thrombosis or mass effect related to surrounding fibrosis.     Diffusely altered hepatic perfusion, perhaps related to the portal venous abnormalities versus infiltrative neoplasm.

## 2022-11-08 NOTE — HPI
Corey Esquivel is pleasant 83 yo male with no significant medical history who presents to ED complaining of worseing short of breath, CHANDRA and chest tightness for 2-3 months. He stated that he gets tired easily. 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 He feels uncomfortable when takes deep breath. He also complains diffuse itching all over body that started few weeks ago.   He denies fever, weight changes, constipation, diarrhea, blood in stool or urine.     On admission, his vital signs are unremarkable.   Lab results are remarkable for elevated total bili 4.0, elevated alk phosphatase 315 and elevtaed , and ALT 75. Negative initial Trop. Bnp wnl.     Liver US with doppler shows findings concerning for at least partially thrombosed intrahepatic main portal vein near the confluence and right portal vein (with reversed flow). Cirrhosis with sequela of portal hypertension to include splenomegaly and mild upper abdominal ascites.  Indeterminate lesion in the right hepatic lobe    Cxr shows no acute pulmonary abnorlaity   EKG show sinus rhythm with new incomplete rbbb and criteria for septal infarct, age undetermine.

## 2022-11-08 NOTE — TELEPHONE ENCOUNTER
----- Message from Felicitas Woods sent at 11/8/2022  4:24 PM CST -----  Regarding: HFU  Patient will be discharging from [facility] and will need to be seen by Hepatology.  Please schedule as appropriate and message me back so DC Nurse can relay appointment information to patient prior to discharge.      DX: Cirrhosis of liver with ascites, unspecified hepatic cirrhosis type     Thanks, Augustina  Access Navigator/Discharge

## 2022-11-08 NOTE — ASSESSMENT & PLAN NOTE
- MRI abdomen (11/8/22) reveals a 3.5cm mass in the right lobe of the liver, concerning for hepatocellular carcinoma.  - I have spoken with interventional radiology. The concern is that he may potentially have multifocal hepatocellular carcinoma in the left lobe of the liver.   - his Child-Young score is C (10 points). From this alone he is not a candidate for systemic therapy.  - I will start actigall and see if that helps his hyperbilirubinemia. It does not appear he has significant ductal dilation, so unclear if ERCP would be considered/helpful.  - recommend palliative care consult  - follow up AFP. If normal, perhaps consideration for biopsy to confirm the diagnosis of hepatocellular carcinoma.  - may be appropriate to keep patient in hospital while clarifying the above issues. If hyperbilirubinemia cannot be fixed, his life expectancy is likely measured in weeks/months, and hospice would be appropriate.

## 2022-11-08 NOTE — SUBJECTIVE & OBJECTIVE
Interval History: doing well; endorses fatigue and dyspnea with exertion.  No abdominal pain.  Discussed extensively findings of imaging with the patient.  Discussed with Interventional Radiology and Heme Onc; concern that findings represent HCC in the patient may not be a candidate for additional therapy.  Will discuss again with GI given MRI results.  Palliative care consult as well.    Review of Systems   Constitutional:  Negative for chills and fever.   Respiratory:  Positive for shortness of breath (With exertion).    Cardiovascular:  Negative for chest pain and leg swelling.   Gastrointestinal:  Negative for abdominal pain, diarrhea, nausea and vomiting.   Neurological:  Positive for weakness.   Psychiatric/Behavioral:  Negative for confusion.    Objective:     Vital Signs (Most Recent):  Temp: 96.3 °F (35.7 °C) (11/08/22 0759)  Pulse: 65 (11/08/22 0952)  Resp: 15 (11/08/22 0952)  BP: (!) 164/72 (11/08/22 0952)  SpO2: 96 % (11/08/22 1200)   Vital Signs (24h Range):  Temp:  [96.3 °F (35.7 °C)-98.6 °F (37 °C)] 96.3 °F (35.7 °C)  Pulse:  [58-71] 65  Resp:  [15-21] 15  SpO2:  [93 %-98 %] 96 %  BP: (113-172)/(58-77) 164/72     Weight: 118.3 kg (260 lb 12.9 oz)  Body mass index is 31.75 kg/m².    Intake/Output Summary (Last 24 hours) at 11/8/2022 1630  Last data filed at 11/8/2022 0959  Gross per 24 hour   Intake --   Output 45 ml   Net -45 ml      Physical Exam  Constitutional:       Appearance: Normal appearance.   HENT:      Head: Normocephalic and atraumatic.      Nose: Nose normal.   Eyes:      General: Scleral icterus present.      Extraocular Movements: Extraocular movements intact.      Pupils: Pupils are equal, round, and reactive to light.   Cardiovascular:      Rate and Rhythm: Normal rate and regular rhythm.      Heart sounds: Normal heart sounds.   Pulmonary:      Effort: Pulmonary effort is normal.      Breath sounds: Normal breath sounds.   Abdominal:      General: There is distension.       Tenderness: There is no abdominal tenderness. There is no right CVA tenderness, left CVA tenderness, guarding or rebound.   Musculoskeletal:         General: Normal range of motion.      Cervical back: Normal range of motion.   Skin:     General: Skin is warm and dry.   Neurological:      General: No focal deficit present.      Mental Status: He is alert.   Psychiatric:         Mood and Affect: Mood normal.         Behavior: Behavior normal.       Significant Labs: All pertinent labs within the past 24 hours have been reviewed.    Significant Imaging: I have reviewed all pertinent imaging results/findings within the past 24 hours.

## 2022-11-08 NOTE — ED NOTES
Assisted pt. To ambulate to bathroom without difficulty. Updated pt. And wife on inpatient bed status.

## 2022-11-08 NOTE — PLAN OF CARE
VN note: VN completed AVS and attachments and notified bedside nurseNargis. Will cont to be available and intervene prn.

## 2022-11-08 NOTE — HPI
82 year-old male was admitted on 11/7/22 for portal vein thrombosis/cirrhosis/hyperbilirubinemia. Imaging is concerning for hepatocellular carcinoma. Consult is for hepatocellular carcinoma.

## 2022-11-08 NOTE — PROGRESS NOTES
[facility] Medicine  Progress Note    Patient Name: Corey Esquivel  MRN: [id number]  Patient Class: OP- Observation   Admission Date: 11/7/2022  Length of Stay: 0 days  Attending Physician: Richard Wilson, *  Primary Care Provider: Niecy Davis MD        Subjective:     Principal Problem:Portal vein thrombosis secondary to HCC invasion        HPI:  Corey Esquivel is pleasant 83 yo male with no significant medical history who presents to ED complaining of worseing short of breath, CHANDRA and chest tightness for 2-3 months. He stated that he gets tired easily. 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 He feels uncomfortable when takes deep breath. He also complains diffuse itching all over body that started few weeks ago.   He denies fever, weight changes, constipation, diarrhea, blood in stool or urine.     On admission, his vital signs are unremarkable.   Lab results are remarkable for elevated total bili 4.0, elevated alk phosphatase 315 and elevtaed , and ALT 75. Negative initial Trop. Bnp wnl.     Liver US with doppler shows findings concerning for at least partially thrombosed intrahepatic main portal vein near the confluence and right portal vein (with reversed flow). Cirrhosis with sequela of portal hypertension to include splenomegaly and mild upper abdominal ascites.  Indeterminate lesion in the right hepatic lobe    Cxr shows no acute pulmonary abnorlaity   EKG show sinus rhythm with new incomplete rbbb and criteria for septal infarct, age undetermine.       Overview/Hospital Course:  No notes on file    Interval History: doing well; endorses fatigue and dyspnea with exertion.  No abdominal pain.  Discussed extensively findings of imaging with the patient.  Discussed with Interventional Radiology and Heme Onc; concern that findings represent HCC in the patient may not be a candidate for additional therapy.  Will discuss again with GI given  MRI results.  Palliative care consult as well.    Review of Systems   Constitutional:  Negative for chills and fever.   Respiratory:  Positive for shortness of breath (With exertion).    Cardiovascular:  Negative for chest pain and leg swelling.   Gastrointestinal:  Negative for abdominal pain, diarrhea, nausea and vomiting.   Neurological:  Positive for weakness.   Psychiatric/Behavioral:  Negative for confusion.    Objective:     Vital Signs (Most Recent):  Temp: 96.3 °F (35.7 °C) (11/08/22 0759)  Pulse: 65 (11/08/22 0952)  Resp: 15 (11/08/22 0952)  BP: (!) 164/72 (11/08/22 0952)  SpO2: 96 % (11/08/22 1200)   Vital Signs (24h Range):  Temp:  [96.3 °F (35.7 °C)-98.6 °F (37 °C)] 96.3 °F (35.7 °C)  Pulse:  [58-71] 65  Resp:  [15-21] 15  SpO2:  [93 %-98 %] 96 %  BP: (113-172)/(58-77) 164/72     Weight: 118.3 kg (260 lb 12.9 oz)  Body mass index is 31.75 kg/m².    Intake/Output Summary (Last 24 hours) at 11/8/2022 1630  Last data filed at 11/8/2022 0959  Gross per 24 hour   Intake --   Output 45 ml   Net -45 ml      Physical Exam  Constitutional:       Appearance: Normal appearance.   HENT:      Head: Normocephalic and atraumatic.      Nose: Nose normal.   Eyes:      General: Scleral icterus present.      Extraocular Movements: Extraocular movements intact.      Pupils: Pupils are equal, round, and reactive to light.   Cardiovascular:      Rate and Rhythm: Normal rate and regular rhythm.      Heart sounds: Normal heart sounds.   Pulmonary:      Effort: Pulmonary effort is normal.      Breath sounds: Normal breath sounds.   Abdominal:      General: There is distension.      Tenderness: There is no abdominal tenderness. There is no right CVA tenderness, left CVA tenderness, guarding or rebound.   Musculoskeletal:         General: Normal range of motion.      Cervical back: Normal range of motion.   Skin:     General: Skin is warm and dry.   Neurological:      General: No focal deficit present.      Mental Status: He is  alert.   Psychiatric:         Mood and Affect: Mood normal.         Behavior: Behavior normal.       Significant Labs: All pertinent labs within the past 24 hours have been reviewed.    Significant Imaging: I have reviewed all pertinent imaging results/findings within the past 24 hours.      Assessment/Plan:      * Portal vein thrombosis secondary to HCC invasion  - likely 2/2 HCC  - GI does not recommend AC at this time  - asymptomatic  - see below    HCC (hepatocellular carcinoma)  - concerning on MRI  - AFP pending  - unclear if candidate for chemo  - discuss with GI whether ERCP would be helpful in lowering bilirubin  - started actigall  - may need biopsy if AFP wnl  - Palliative care consult      Cirrhosis of liver with ascites  - likely due to malignancy  - MRI with concern for multifocal HCC  - AFP pending  - discuss whether ERCP would be beneficial in lowering bilirubin with GI (may make patient chemo candidate)  - IR paracentesis today not c/w SBP      Transaminitis  No history of significant alcohol use per patient and family.   Acute hepatitis panel negative  -likely secondary to malignancy  GI consult       Mixed hyperlipidemia  Currently not on medication.         VTE Risk Mitigation (From admission, onward)         Ordered     heparin (porcine) injection 5,000 Units  Every 8 hours         11/07/22 1740     IP VTE HIGH RISK PATIENT  Once         11/07/22 1740     Place sequential compression device  Until discontinued         11/07/22 1740                Discharge Planning   TERRY: 11/8/2022     Code Status: Full Code   Is the patient medically ready for discharge?:     Reason for patient still in hospital (select all that apply): Laboratory test, Consult recommendations and Pending disposition  Discharge Plan A: Home with family                  Richard Wilson MD  Department of Hospital Medicine   [place] [facility]

## 2022-11-08 NOTE — PLAN OF CARE
VN reviewed discharge instructions with pt.'s wife. Using teach back method.  AVS printed and handed to pt by bedside nurse.  Reviewed follow-up appointments, medications, diet, and importance of medication compliance.  Reviewed home care instructions, treatment plan, self-management, and when to seek medical attention.  Allowed time for questions.  All questions answered.  Patient's wife verbalized complete understanding of discharge instructions and voices no concerns.     Discharge instructions complete.   Transport/wheelchair requested.  Bedside nurse notified.

## 2022-11-08 NOTE — PLAN OF CARE
SW met with pt via [employer] to complete assessment. Pt is AxO 3 and able to verbally answer assessment questions.  Pt has both daughters at bedside. Pt report to live at home alone and feeling safe. Pt is reported to drive and able to drive himself to doctors appointments. At time of discharge pt family at bedside will transport pt back home. While at home pt reports to live with his spouse. Pt report to be independent of ADL's and no DME in use. Pt has added daughter Leigh Ann to his emergency contacts [phone]. Pt daughter Leigh Ann has confirmed [facility]. Davis. SW updated whiteboard with [facility] name and contact information. SW confirmed pt understanding of Observation unit and expected discharge plan. SW will continue to follow pt throughout care and assist with any discharge needs.         11/08/22 1502   Discharge Planning   Assessment Type Discharge Planning Brief Assessment   Resource/Environmental Concerns none   Support Systems Spouse/significant other;Family members;Children   Equipment Currently Used at Home none   Current Living Arrangements home/apartment/condo   Patient/Family Anticipates Transition to home with family   Patient/Family Anticipated Services at Transition none   DME Needed Upon Discharge  none   Discharge Plan A Home with family     Future Appointments   Date Time Provider Department Center   11/22/2022  1:30 PM Nela Telles MD [facility]   1/4/2023 11:40 AM Coy Pleitez MD [facility]     JODIE Parikh Case Management  [phone]

## 2022-11-08 NOTE — CONSULTS
"[facility]    CC: abnormal imaging of GI tract    HPI 82 y.o. male with PMH significant for HTN GI consulted for new, persistent, painless imaging of GI tract not associated with history of coagulopathy or cancer. Imaging revealed portal vein thrombosis with reversal of blood flow.    Is a . Told by PCP his liver enzymes were elevated in October but never had problems with his liver in past.    Past Medical History  HTN    Past Surgical History  Past Surgical History:   Procedure Laterality Date    ABDOMINAL SURGERY      CHOLECYSTECTOMY      HERNIA REPAIR      SHOULDER SURGERY         Social History  T - quit  E - remote history of heavy drinking years ago  I - never    Family History  Denies CRC    Review of Systems  General ROS: +fatigue. negative for chills, fever or weight loss  Psychological ROS: negative for hallucination, depression, anxiety, or suicidal ideation  Ophthalmic ROS: negative for blurry vision, photophobia or eye pain  ENT ROS: negative for epistaxis, sore throat or rhinorrhea  Respiratory ROS: +shortness of breath. no cough or wheezing  Cardiovascular ROS: +dyspnea on exertion. no chest pain  Gastrointestinal ROS: no abdominal pain, change in bowel habits, or black/ bloody stools  Genito-Urinary ROS: no dysuria, trouble voiding, or hematuria  Musculoskeletal ROS: negative for gait disturbance or muscular weakness  Neurological ROS: +lightheadedness. no syncope or seizures; no ataxia  Dermatological ROS: negative for pruritis, rash and jaundice    Physical Examination  BP (!) 164/72   Pulse 65   Temp 96.3 °F (35.7 °C) (Oral)   Resp 15   Ht 6' 4" (1.93 m)   Wt 118.3 kg (260 lb 12.9 oz)   SpO2 96%   BMI 31.75 kg/m²   General appearance: alert, cooperative, no distress  HENT: Normocephalic, atraumatic, neck symmetrical, no nasal discharge   Eyes: no scleral icterus, PERRL, EOM's intact  Heart: regular rate and without rub; no displacement of the PMI   Abdomen: soft, " non-tender; non-distended, dullness to percussion, bowel sounds normoactive; no organomegaly  Extremities: symmetric; no clubbing, cyanosis  Integument: color, texture, turgor normal; no rashes; hair distrubution normal  Neurologic: Alert and oriented X 3, normal sensation, normal coordination  Psychiatric: no pressured speech; normal affect; no evidence of impaired cognition     Recent Labs   Lab 11/08/22  0331   *   K 4.1      CO2 19*   *   BUN 28*   CREATININE 1.3     Recent Labs   Lab 11/08/22  0331   PROT 5.4*   ALBUMIN 2.2*   BILITOT 4.3*   *   ALT 64*   ALKPHOS 265*       Additional history obtained from daughter via phone    Review and summarization of old records    Assessment:   82 y.o. male with PMH significant for HTN admitted for portal vein thrombus. Endo: none. GI consulted for:    #Portal vein thrombus - appears to be chronic, partial and not complete obstruction. Currently asymptomatic.  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 Hb 12.9, plts 147. INR 1. Albumin 2.2, t bili 4.3, AST//64, AlkPhos 265. Acute hepatitis panel normal.    Plan:   -needs outpatient variceal screening EGD  -do NOT advise starting anticoagulation as unlikely to survive a severe bleeding event given age. Will need EGD for variceal screening to assess bleeding risk  -will need hypercoagulability work up  -will need outpatient hepatology follow up  -will f/u AFP  -advise CT triple phase instead of MRCP  -agree with paracentesis - please test for culture/gram stain, albumin, cytology, cell count    Attestation to follow which may differ from above plan. Please appreciate.    Christian Gurrola MD  [facility]

## 2022-11-08 NOTE — PROGRESS NOTES
"[facility]           Pharmacy  Admission Medication Reconciliation     Based on information gathered for medication list, you may go to "Admission" then "Reconcile Home Medications" tabs to review and/or act upon those items.     The home medication list has been updated by the Pharmacy department.   Please read ALL comments highlighted in red.   Please address this information as you see fit.    Feel free to contact us if you have any questions or require assistance.    Home medication list has been compared to current inpatient medications. Please review the following discrepancies noted below:    Patient reports STILL TAKING the following medication(s) which was not ordered upon admit  Hctz 25mg daily  Lisinopril 10mg daily      Feel free to contact us if you have any questions or require assistance.    Marcela Pantoja, PharmD  [phone]    "

## 2022-11-08 NOTE — ASSESSMENT & PLAN NOTE
No history of significant alcohol use per patient and family.   Acute hepatitis panel negative  -likely secondary to malignancy  GI consult

## 2022-11-08 NOTE — SUBJECTIVE & OBJECTIVE
No past medical history on file.    Past Surgical History:   Procedure Laterality Date    ABDOMINAL SURGERY      CHOLECYSTECTOMY      HERNIA REPAIR      SHOULDER SURGERY         Review of patient's allergies indicates:  No Known Allergies    No current facility-administered medications on file prior to encounter.     Current Outpatient Medications on File Prior to Encounter   Medication Sig    hydroCHLOROthiazide (HYDRODIURIL) 25 MG tablet Take 25 mg by mouth once daily.    lisinopriL 10 MG tablet Take 10 mg by mouth once daily.    cetirizine HCl/pseudoephedrine (ZYRTEC-D ORAL) Take by mouth.    GLUC/MICHAEL-MSM#2/C/D3/MARK/BORN (GLUCOSAM-CHONDR-MSM WITH VIT D ORAL) Take by mouth.    [DISCONTINUED] bacitracin 500 unit/gram Oint Apply topically 2 (two) times daily.    [DISCONTINUED] traMADoL (ULTRAM) 50 mg tablet Take 1 tablet (50 mg total) by mouth every 12 (twelve) hours as needed for Pain.     Family History    None       Tobacco Use    Smoking status: Former    Smokeless tobacco: Former   Substance and Sexual Activity    Alcohol use: No    Drug use: No    Sexual activity: Not on file     Review of Systems   Constitutional:  Positive for activity change and fatigue.   HENT: Negative.     Eyes: Negative.    Respiratory:  Positive for chest tightness and shortness of breath.    Cardiovascular: Negative.    Gastrointestinal:  Positive for abdominal distention.   Endocrine: Negative.    Genitourinary: Negative.    Musculoskeletal: Negative.    Allergic/Immunologic: Negative.    Neurological: Negative.    Hematological: Negative.    Psychiatric/Behavioral: Negative.     Objective:     Vital Signs (Most Recent):  Temp: 96.6 °F (35.9 °C) (11/07/22 2055)  Pulse: 71 (11/07/22 2055)  Resp: (!) 21 (11/07/22 1905)  BP: (!) 146/70 (11/07/22 2055)  SpO2: 98 % (11/07/22 2055)   Vital Signs (24h Range):  Temp:  [96.6 °F (35.9 °C)-98.4 °F (36.9 °C)] 96.6 °F (35.9 °C)  Pulse:  [55-78] 71  Resp:  [14-32] 21  SpO2:  [94 %-98 %] 98  %  BP: (135-166)/(68-79) 146/70     Weight: 117.9 kg (260 lb)  Body mass index is 31.65 kg/m².    Physical Exam  Constitutional:       Appearance: Normal appearance.   HENT:      Head: Normocephalic and atraumatic.      Nose: Nose normal.   Eyes:      Extraocular Movements: Extraocular movements intact.      Pupils: Pupils are equal, round, and reactive to light.   Cardiovascular:      Rate and Rhythm: Normal rate and regular rhythm.      Heart sounds: Normal heart sounds.   Pulmonary:      Effort: Pulmonary effort is normal.      Breath sounds: Normal breath sounds.   Abdominal:      General: There is distension.      Tenderness: There is no abdominal tenderness. There is no right CVA tenderness, left CVA tenderness, guarding or rebound.   Musculoskeletal:         General: Normal range of motion.      Cervical back: Normal range of motion.   Skin:     General: Skin is warm and dry.   Neurological:      General: No focal deficit present.      Mental Status: He is alert.   Psychiatric:         Mood and Affect: Mood normal.         Behavior: Behavior normal.         CRANIAL NERVES     CN III, IV, VI   Pupils are equal, round, and reactive to light.     Significant Labs: All pertinent labs within the past 24 hours have been reviewed.  CBC:   Recent Labs   Lab 11/07/22 0926   WBC 4.96   HGB 12.9*   HCT 38.7*   *     CMP:   Recent Labs   Lab 11/07/22 0926   *   K 5.1      CO2 18*   GLU 98   BUN 23   CREATININE 1.2   CALCIUM 8.8   PROT 6.4   ALBUMIN 2.7*   BILITOT 4.0*   ALKPHOS 315*   *   ALT 75*   ANIONGAP 11     Cardiac Markers:   Recent Labs   Lab 11/07/22  0926   BNP 54     Magnesium:   Recent Labs   Lab 11/07/22  0926   MG 2.0     Troponin:   Recent Labs   Lab 11/07/22  0926   TROPONINI 0.015     TSH: No results for input(s): TSH in the last 4320 hours.    Significant Imaging: I have reviewed all pertinent imaging results/findings within the past 24 hours.

## 2022-11-08 NOTE — ASSESSMENT & PLAN NOTE
Unclear etiology. No history of alcohol use per patient and family.   Acute hepatitis panel in process.   GI consult

## 2022-11-08 NOTE — TREATMENT PLAN
[facility] RN met with patient, spouse , and daughter regarding [facility] hospital follow up upon discharge. Pt agreeable to hospital follow up at this time but [facility].RN informed pt of scheduled appointment and that appointment will also appear on d/c AVS. Patient informed to bring portable home O2 if used and all medication bottles to PCC follow up appointment.  [facility] information handout, appointment letter and folder provided to patient. Pt states family  will provide transportation to appointment.    Patient Contact info:[phone]    Person providing transportation contact info: Eliane Esquivel (Spouse)   [phone] (Mobile)    Barriers to attending [facility] visit: [facility].    Future Appointments   Date Time Provider Department Center   11/22/2022  1:30 PM Nela Telles MD [facility]   1/4/2023 11:40 AM Coy Pleitez MD [facility] CARDIO [facility]

## 2022-11-08 NOTE — PROGRESS NOTES
11/07/22 2131   Admission   Initial VN Admission Questions Complete   Communication Issues? None   Shift   Virtual Nurse - Patient Verbalized Approval Of Camera Use   Safety/Activity   Patient Rounds bed in low position;placement of personal items at bedside;call light in patient/parent reach;visualized patient;clutter free environment maintained   Safety Promotion/Fall Prevention Fall Risk reviewed with patient/family;medications reviewed;side rails raised x 2;instructed to call staff for mobility;room near unit station   Positioning   Body Position position changed independently   Head of Bed (HOB) Positioning HOB elevated

## 2022-11-08 NOTE — ED NOTES
Admit team at bedside.  Wife at bedside. Pt. Instructed he may have a regular diet. Pt. Is not hungry now.

## 2022-11-08 NOTE — ASSESSMENT & PLAN NOTE
- likely due to malignancy  - MRI with concern for multifocal HCC  - AFP pending  - discuss whether ERCP would be beneficial in lowering bilirubin with GI (may make patient chemo candidate)  - IR paracentesis today not c/w SBP

## 2022-11-08 NOTE — PLAN OF CARE
Problem: Adult Inpatient Plan of Care  Goal: Plan of Care Review  11/8/2022 0105 by Kinjal Fish RN  Outcome: Ongoing, Progressing  11/8/2022 0104 by Kinjal Fish RN  Outcome: Ongoing, Progressing  11/8/2022 0017 by Kinjal Fish, RN  Outcome: Ongoing, Progressing     Problem: Fall Injury Risk  Goal: Absence of Fall and Fall-Related Injury  11/8/2022 0105 by Kinjal Fish RN  Outcome: Ongoing, Progressing  11/8/2022 0104 by Kinjal Fish, RN  Outcome: Ongoing, Progressing  11/8/2022 0017 by Kinjal Fish RN  Outcome: Ongoing, Progressing     Problem: Fatigue  Goal: Improved Activity Tolerance  11/8/2022 0105 by Kinjal Fish RN  Outcome: Ongoing, Progressing  11/8/2022 0104 by Kinjal Fish RN  Outcome: Ongoing, Progressing

## 2022-11-08 NOTE — CONSULTS
Inpatient Radiology Pre-procedure Note    History of Present Illness:  Corey Esquivel is a 82 y.o. male who presents for paracentesis.  Admission H&P reviewed.  No past medical history on file.  Past Surgical History:   Procedure Laterality Date    ABDOMINAL SURGERY      CHOLECYSTECTOMY      HERNIA REPAIR      SHOULDER SURGERY         Review of Systems:   As documented in primary team H&P    Home Meds:   Prior to Admission medications    Medication Sig Start Date End Date Taking? Authorizing Provider   hydroCHLOROthiazide (HYDRODIURIL) 25 MG tablet Take 25 mg by mouth once daily.   Yes Historical Provider   lisinopriL 10 MG tablet Take 10 mg by mouth once daily.   Yes Historical Provider   cetirizine HCl/pseudoephedrine (ZYRTEC-D ORAL) Take by mouth.    Historical Provider   GLUC/MICHAEL-MSM#2/C/D3/MARK/BORN (GLUCOSAM-CHONDR-MSM WITH VIT D ORAL) Take by mouth.    Historical Provider     Scheduled Meds:    heparin (porcine)  5,000 Units Subcutaneous Q8H     Continuous Infusions:   PRN Meds:acetaminophen, melatonin, ondansetron, sodium chloride 0.9%  Anticoagulants/Antiplatelets: no anticoagulation    Allergies: Review of patient's allergies indicates:  No Known Allergies  Sedation Hx: have not been any systemic reactions    Labs:  Recent Labs   Lab 11/07/22  1703   INR 1.0       Recent Labs   Lab 11/07/22 0926   WBC 4.96   HGB 12.9*   HCT 38.7*   MCV 98   *      Recent Labs   Lab 11/07/22 0926 11/08/22  0331   GLU 98 117*   * 135*   K 5.1 4.1    105   CO2 18* 19*   BUN 23 28*   CREATININE 1.2 1.3   CALCIUM 8.8 8.6*   MG 2.0  --    ALT 75* 64*   * 100*   ALBUMIN 2.7* 2.2*   BILITOT 4.0* 4.3*   BILIDIR  --  2.3*         Vitals:  Temp: 96.3 °F (35.7 °C) (11/08/22 0759)  Pulse: 69 (11/08/22 0938)  Resp: 15 (11/08/22 0938)  BP: (!) 172/77 (11/08/22 0938)  SpO2: 96 % (11/08/22 0938)     Physical Exam:  ASA: 3  Mallampati: 2    General: no acute distress  Mental Status: alert and oriented to person,  place and time  HEENT: normocephalic, atraumatic  Chest: unlabored breathing  Heart: regular heart rate  Abdomen: nondistended  Extremity: moves all extremities    Plan: US guided paracentesis  Sedation Plan: Local only    Ken Jean M.D.  Interventional Radiology  Department of Radiology  Pager: [phone]

## 2022-11-08 NOTE — TELEPHONE ENCOUNTER
----- Message from Felicitas Woods sent at 11/8/2022  4:24 PM CST -----  Regarding: HFU  Patient will be discharging from [facility] and will need to be seen by Hepatology.  Please schedule as appropriate and message me back so DC Nurse can relay appointment information to patient prior to discharge.      DX: Cirrhosis of liver with ascites, unspecified hepatic cirrhosis type     Thanks, Augustina  Access Navigator/Discharge     Dr Sam

## 2022-11-08 NOTE — SEDATION DOCUMENTATION
IR paracentesis, removed 45mL dark yellow fluid from RLQ abd, applied bandaid to site, CDI, VS stable, room air, sent specimen to lab as ordered, called and gave phone report to Susan Rn, pt waiting to transport back to room, IR care complete

## 2022-11-09 ENCOUNTER — TELEPHONE (OUTPATIENT)
Dept: PHARMACY | Facility: CLINIC | Age: 82
End: 2022-11-09
Payer: MEDICARE

## 2022-11-09 ENCOUNTER — TELEPHONE (OUTPATIENT)
Dept: INFUSION THERAPY | Facility: HOSPITAL | Age: 82
End: 2022-11-09
Payer: MEDICARE

## 2022-11-09 VITALS
WEIGHT: 260.81 LBS | HEIGHT: 76 IN | OXYGEN SATURATION: 94 % | HEART RATE: 73 BPM | TEMPERATURE: 98 F | SYSTOLIC BLOOD PRESSURE: 159 MMHG | DIASTOLIC BLOOD PRESSURE: 77 MMHG | RESPIRATION RATE: 18 BRPM | BODY MASS INDEX: 31.76 KG/M2

## 2022-11-09 DIAGNOSIS — C22.0 HEPATOCELLULAR CARCINOMA: Primary | ICD-10-CM

## 2022-11-09 PROBLEM — Z51.5 PALLIATIVE CARE ENCOUNTER: Status: ACTIVE | Noted: 2022-11-09

## 2022-11-09 LAB
ALBUMIN SERPL BCP-MCNC: 2.2 G/DL (ref 3.5–5.2)
ALP SERPL-CCNC: 254 U/L (ref 55–135)
ALT SERPL W/O P-5'-P-CCNC: 66 U/L (ref 10–44)
ANION GAP SERPL CALC-SCNC: 7 MMOL/L (ref 8–16)
AST SERPL-CCNC: 109 U/L (ref 10–40)
BASOPHILS # BLD AUTO: 0.03 K/UL (ref 0–0.2)
BASOPHILS NFR BLD: 0.6 % (ref 0–1.9)
BILIRUB SERPL-MCNC: 4.6 MG/DL (ref 0.1–1)
BUN SERPL-MCNC: 22 MG/DL (ref 8–23)
CALCIUM SERPL-MCNC: 8.6 MG/DL (ref 8.7–10.5)
CHLORIDE SERPL-SCNC: 102 MMOL/L (ref 95–110)
CO2 SERPL-SCNC: 25 MMOL/L (ref 23–29)
CREAT SERPL-MCNC: 1.2 MG/DL (ref 0.5–1.4)
DIFFERENTIAL METHOD: ABNORMAL
EOSINOPHIL # BLD AUTO: 0.3 K/UL (ref 0–0.5)
EOSINOPHIL NFR BLD: 4.6 % (ref 0–8)
ERYTHROCYTE [DISTWIDTH] IN BLOOD BY AUTOMATED COUNT: 15 % (ref 11.5–14.5)
EST. GFR  (NO RACE VARIABLE): >60 ML/MIN/1.73 M^2
GLUCOSE SERPL-MCNC: 91 MG/DL (ref 70–110)
HCT VFR BLD AUTO: 33.8 % (ref 40–54)
HGB BLD-MCNC: 11.7 G/DL (ref 14–18)
IMM GRANULOCYTES # BLD AUTO: 0.01 K/UL (ref 0–0.04)
IMM GRANULOCYTES NFR BLD AUTO: 0.2 % (ref 0–0.5)
LYMPHOCYTES # BLD AUTO: 1.8 K/UL (ref 1–4.8)
LYMPHOCYTES NFR BLD: 32.3 % (ref 18–48)
MCH RBC QN AUTO: 33.1 PG (ref 27–31)
MCHC RBC AUTO-ENTMCNC: 34.6 G/DL (ref 32–36)
MCV RBC AUTO: 96 FL (ref 82–98)
MONOCYTES # BLD AUTO: 0.6 K/UL (ref 0.3–1)
MONOCYTES NFR BLD: 11.2 % (ref 4–15)
NEUTROPHILS # BLD AUTO: 2.8 K/UL (ref 1.8–7.7)
NEUTROPHILS NFR BLD: 51.1 % (ref 38–73)
NRBC BLD-RTO: 0 /100 WBC
PLATELET # BLD AUTO: 165 K/UL (ref 150–450)
PMV BLD AUTO: 10.6 FL (ref 9.2–12.9)
POTASSIUM SERPL-SCNC: 4.1 MMOL/L (ref 3.5–5.1)
PROT SERPL-MCNC: 5.3 G/DL (ref 6–8.4)
RBC # BLD AUTO: 3.53 M/UL (ref 4.6–6.2)
SODIUM SERPL-SCNC: 134 MMOL/L (ref 136–145)
WBC # BLD AUTO: 5.45 K/UL (ref 3.9–12.7)

## 2022-11-09 PROCEDURE — 99205 OFFICE O/P NEW HI 60 MIN: CPT | Mod: ,,,

## 2022-11-09 PROCEDURE — 99205 PR OFFICE/OUTPT VISIT, NEW, LEVL V, 60-74 MIN: ICD-10-PCS | Mod: ,,,

## 2022-11-09 PROCEDURE — 80053 COMPREHEN METABOLIC PANEL: CPT | Performed by: HOSPITALIST

## 2022-11-09 PROCEDURE — 25000003 PHARM REV CODE 250: Performed by: INTERNAL MEDICINE

## 2022-11-09 PROCEDURE — 94761 N-INVAS EAR/PLS OXIMETRY MLT: CPT

## 2022-11-09 PROCEDURE — 36415 COLL VENOUS BLD VENIPUNCTURE: CPT | Performed by: HOSPITALIST

## 2022-11-09 PROCEDURE — 99497 PR ADVNCD CARE PLAN 30 MIN: ICD-10-PCS | Mod: 25,,,

## 2022-11-09 PROCEDURE — G0378 HOSPITAL OBSERVATION PER HR: HCPCS

## 2022-11-09 PROCEDURE — 99497 ADVNCD CARE PLAN 30 MIN: CPT | Mod: 25,,,

## 2022-11-09 PROCEDURE — 85025 COMPLETE CBC W/AUTO DIFF WBC: CPT | Performed by: HOSPITALIST

## 2022-11-09 RX ORDER — URSODIOL 300 MG/1
300 CAPSULE ORAL 2 TIMES DAILY
Qty: 60 CAPSULE | Refills: 11 | Status: SHIPPED | OUTPATIENT
Start: 2022-11-09 | End: 2023-11-09

## 2022-11-09 RX ORDER — HYDROXYZINE HYDROCHLORIDE 25 MG/1
25 TABLET, FILM COATED ORAL 3 TIMES DAILY PRN
Qty: 30 TABLET | Refills: 1 | Status: SHIPPED | OUTPATIENT
Start: 2022-11-09

## 2022-11-09 RX ADMIN — URSODIOL 300 MG: 300 CAPSULE ORAL at 10:11

## 2022-11-09 NOTE — HPI
"Per chart, "Corey Esquivel is pleasant 83 yo male with no significant medical history who presents to ED complaining of worseing short of breath, CHANDRA and chest tightness for 2-3 months. He stated that he gets tired easily. 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 He feels uncomfortable when takes deep breath. He also complains diffuse itching all over body that started few weeks ago.   He denies fever, weight changes, constipation, diarrhea, blood in stool or urine.      On admission, his vital signs are unremarkable.   Lab results are remarkable for elevated total bili 4.0, elevated alk phosphatase 315 and elevtaed , and ALT 75. Negative initial Trop. Bnp wnl.      Liver US with doppler shows findings concerning for at least partially thrombosed intrahepatic main portal vein near the confluence and right portal vein (with reversed flow). Cirrhosis with sequela of portal hypertension to include splenomegaly and mild upper abdominal ascites.  Indeterminate lesion in the right hepatic lobe     Cxr shows no acute pulmonary abnorlaity   EKG show sinus rhythm with new incomplete rbbb and criteria for septal infarct, age undetermine. "     "

## 2022-11-09 NOTE — TELEPHONE ENCOUNTER
----- Message from Quinn Small MD sent at 11/9/2022  1:57 PM CST -----  (Sending to you to make sure it's done quickly).  1. Schedule cbc, cmp, PT/INR and appt with me next week (okay to double book).    Thanks!

## 2022-11-09 NOTE — PLAN OF CARE
D/C recs noted. Pt to have follow up with hematology, PCC, and hepatolgy with one of following requested doctors: Surjit Vela, Shahriar Ernst, or Will Jones. Pt to be seen by palliative care. Pharmacist will go over home medications and reasons for medications. VN and bedside nurse to reiterate final discharge instructions.       At time of discharge pt will be transported home by daughter Leigh Ann MERAZ at discharge: none  Home Health:none    Pt to have follow up appointments added to AVS.         11/09/22 1019   Final Note   Assessment Type Final Discharge Note   Anticipated Discharge Disposition Home   What phone number can be called within the next 1-3 days to see how you are doing after discharge? [phone]   Hospital Resources/Appts/Education Provided Appointments scheduled by Navigator/Coordinator   Post-Acute Status   Discharge Delays None known at this time     Future Appointments   Date Time Provider Department Center   11/17/2022 11:30 AM Renuka Carrion MD [facility] [place]   11/22/2022  1:30 PM Nela Telles MD [facility]   1/4/2023 11:40 AM Coy Pleitez MD [facility] [place] [facility]     JODIE Parikh Case Management  [phone]

## 2022-11-09 NOTE — PLAN OF CARE
AVS printed and handed to pt by bedside nurse.  Upon review of AVS, pt, wife and daughter had multiple questions and concerns. Requested to see palliative care and oncologist prior to going home, new RX for anxiety medication, and a different referral apt with hepatology. Secure chat sent to dr oliveros and  bryce.     Reviewed follow-up appointments, medications, diet, and importance of medication compliance.  Reviewed home care instructions, treatment plan, self-management, and when to seek medical attention.  Verbalized understanding.     Discharge instructions complete.  Waiting on Bedside delivery . Bedside nurse called to room and discussed plan.

## 2022-11-09 NOTE — PLAN OF CARE
Cued intro room to complete review of updated AVS with patient and wife. VN reviewed discharge instructions with pt. Using teach back method.  .  Reviewed follow-up appointments, and new medications, .  Reviewed  when to seek medical attention.  Allowed time for questions.  All questions answered.  Patient verbalized complete understanding of discharge instructions and voices no concerns.     Discharge instructions complete.  Waiting on Bedside delivery done.  .  Bedside nurse notified.

## 2022-11-09 NOTE — PLAN OF CARE
Problem: Adult Inpatient Plan of Care  Goal: Plan of Care Review  Outcome: Ongoing, Progressing  Goal: Optimal Comfort and Wellbeing  Outcome: Ongoing, Progressing     Problem: Coping Ineffective  Goal: Effective Coping  Outcome: Ongoing, Progressing

## 2022-11-09 NOTE — ASSESSMENT & PLAN NOTE
"At time of initial consult, pt resting in bed with wife at the bedside. Pt and wife receptive to palliative visit at this time. Pt denies any pain or discomfort, states that itching has gotten slightly better. Pt and wife express shock over newly diagnosed HCC.  Pt is retired from law enforcement.  Pt lives with his wife Eliane and has 5 adult children and 8 grandchildren. Pts daughter works in the medical field here at [facility], and  plays an active role in their medical care.  Pt enjoys spending time with his family, staying physically active/ walking and the casino.      We discussed the pts new diagnosis of HCC.  Pt endorses the hardest part of this is the "waiting" and the difficulty of "accepting a cancer diagnosis when he has overall been very healthy."  Pt is anxious to see his PCP as he has a good relationship with her and trusts her medical opinions.  PT has an appointment scheduled with his PCP on Monday and with a hepatologist and Dr Small next week as well.     Per oncology notes, "If hyperbilirubinemia cannot be fixed, his life expectancy is likely measured in weeks/months, and hospice would be appropriate.  Actigall was started." This was discussed with the pt by oncology.  Pts wife reports this conversation and feels as if the pt has slightly shut down since this was discussed.  Pt endorses that he isn't ready for that type of news yet. We further discussed that we are still in the initial stages of diagnosis/ treatment options and while we know we are limited in treatment options, we still have ways to treat symptoms and improve the pts quality of life. We discussed goals such as maintaining independence, staying out of the hospital and at home, and symptom management.  I did not introduce hospice by name at this point, but did discuss options and resources for the pt should they be needed later.      Pt and wife agreeable to follow with palliative outpatient in clinic for continued goals of care " discussion and advanced care planning. Given pts limited treatment options, palliative will remain on board to assist with symptom management and end of life care as disease burden increases.

## 2022-11-09 NOTE — HOSPITAL COURSE
"Mr. Esquivel presented with progressive fatigue and shortness of breath. Found to have new onset cirrhosis with ascites and portal vein thrombosis. Additional imaging with MRI concerning for multifocal HCC. AFP 3003; bilirubin 4.6. Discussed with Heme/Onc and GI; given multifocal nature of disease, may be very limited in therapeutic options. No benefit to stenting per GI. Will start ursodiol. Follow up with Oncology, Hepatology, and Palliative as outpatient. Discussed with patient and family at the bedside.    BP (!) 159/77 (BP Location: Right arm, Patient Position: Sitting)   Pulse 73   Temp 97.9 °F (36.6 °C) (Oral)   Resp 18   Ht 6' 4" (1.93 m)   Wt 118.3 kg (260 lb 12.9 oz)   SpO2 (!) 94%   BMI 31.75 kg/m²   Physical Exam  Constitutional:       Appearance: Normal appearance.   HENT:      Head: Normocephalic and atraumatic.      Nose: Nose normal.   Eyes:      General: Scleral icterus present.      Extraocular Movements: Extraocular movements intact.      Pupils: Pupils are equal, round, and reactive to light.   Cardiovascular:      Rate and Rhythm: Normal rate and regular rhythm.      Heart sounds: Normal heart sounds.   Pulmonary:      Effort: Pulmonary effort is normal.      Breath sounds: Normal breath sounds.   Abdominal:      General: There is distension.      Tenderness: There is no abdominal tenderness. There is no right CVA tenderness, left CVA tenderness, guarding or rebound.   Musculoskeletal:         General: Normal range of motion.      Cervical back: Normal range of motion.   Skin:     General: Skin is warm and dry.   Neurological:      General: No focal deficit present.      Mental Status: He is alert.   Psychiatric:         Mood and Affect: Mood normal.         Behavior: Behavior normal.   "

## 2022-11-09 NOTE — SUBJECTIVE & OBJECTIVE
Interval History: Found to have new onset cirrhosis with ascites and portal vein thrombosis. Additional imaging with MRI concerning for multifocal HCC. AFP 3003; bilirubin 4.6. Discussed with Heme/Onc and GI; given multifocal nature of disease, may be very limited in therapeutic options. No benefit to stenting per GI. Will start ursodiol.    No past medical history on file.    Past Surgical History:   Procedure Laterality Date    ABDOMINAL SURGERY      CHOLECYSTECTOMY      HERNIA REPAIR      SHOULDER SURGERY         Review of patient's allergies indicates:  No Known Allergies    Medications:  Continuous Infusions:  Scheduled Meds:   ursodioL  300 mg Oral BID     PRN Meds:acetaminophen, melatonin, ondansetron, sodium chloride 0.9%    Family History    None       Tobacco Use    Smoking status: Former    Smokeless tobacco: Former   Substance and Sexual Activity    Alcohol use: No    Drug use: No    Sexual activity: Not on file       Review of Systems   Constitutional:  Positive for activity change and fatigue. Negative for fever and unexpected weight change.   Respiratory:  Positive for shortness of breath. Negative for cough.    Cardiovascular:  Negative for chest pain and leg swelling.   Gastrointestinal:  Positive for abdominal distention. Negative for constipation and diarrhea.   Neurological:  Positive for weakness.   Psychiatric/Behavioral: Negative.     Objective:     Vital Signs (Most Recent):  Temp: 97.9 °F (36.6 °C) (11/09/22 1138)  Pulse: 73 (11/09/22 1138)  Resp: 18 (11/09/22 1138)  BP: (!) 159/77 (11/09/22 1138)  SpO2: (!) 94 % (11/09/22 1138)   Vital Signs (24h Range):  Temp:  [96.9 °F (36.1 °C)-98 °F (36.7 °C)] 97.9 °F (36.6 °C)  Pulse:  [65-73] 73  Resp:  [18-20] 18  SpO2:  [92 %-94 %] 94 %  BP: (127-171)/(57-81) 159/77     Weight: 118.3 kg (260 lb 12.9 oz)  Body mass index is 31.75 kg/m².    Physical Exam  Vitals and nursing note reviewed. Exam conducted with a chaperone present.   Constitutional:        General: He is awake. He is not in acute distress.     Appearance: Normal appearance. He is well-developed.   HENT:      Head: Normocephalic.      Ears:      Comments: Hard of hearing      Nose: Nose normal.      Mouth/Throat:      Mouth: Mucous membranes are moist.   Eyes:      General: Scleral icterus present.   Cardiovascular:      Rate and Rhythm: Normal rate and regular rhythm.      Pulses: Normal pulses.   Pulmonary:      Effort: Pulmonary effort is normal.      Breath sounds: Normal breath sounds.   Abdominal:      General: There is distension.      Palpations: Abdomen is soft.      Tenderness: There is no abdominal tenderness.   Skin:     General: Skin is warm and dry.      Capillary Refill: Capillary refill takes less than 2 seconds.   Neurological:      General: No focal deficit present.      Mental Status: He is alert and oriented to person, place, and time.   Psychiatric:         Mood and Affect: Mood normal.         Behavior: Behavior normal. Behavior is cooperative.         Thought Content: Thought content normal.         Judgment: Judgment normal.       Review of Symptoms      Symptom Assessment (ESAS 0-10 Scale)  Pain:  0  Dyspnea:  0  Anxiety:  0  Nausea:  0  Depression:  0  Anorexia:  0  Fatigue:  0  Insomnia:  0  Restlessness:  0  Agitation:  0     CAM / Delirium:  Negative  Constipation:  Negative  Diarrhea:  Negative    Constipation:  No constipation    Performance Status:  80    Living Arrangements:  Lives with spouse and Lives in home    Psychosocial/Cultural: Pt lives at home with his wife.  Pt has 5 children.  Pt is independent in all ADLS.    Spiritual:  F - Radha and Belief:  Non Synagogue   I - Importance:  Minimal   C - Community:  Family support      Time-Based Charting:  Yes  Chart Review: 9 minutes  Face to Face: 23 minutes  Symptom Assessment: 12 minutes  Coordination of Care: 13 minutes  Discharge Plannin minutes  Advance Care Plannin minutes  Goals of Care: 8  minutes    Total Time Spent: 89 minutes      Advance Care Planning   Advance Directives:   Living Will: No    [place]: No    Do Not Resuscitate Status: No    Medical Power of : Yes    Agent's Name:  Eliane Esquivel   Agent's Contact Number:  [phone]    Decision Making:  Patient answered questions and Family answered questions  Goals of Care: The patient and family endorses that what is most important right now is to focus on spending time at home, remaining as independent as possible, symptom/pain control, and improvement in condition but with limits to invasive therapies    Accordingly, we have decided that the best plan to meet the patient's goals includes continuing with treatment       Significant Labs: All pertinent labs within the past 24 hours have been reviewed.  CBC:   Recent Labs   Lab 11/09/22 0254   WBC 5.45   HGB 11.7*   HCT 33.8*   MCV 96        BMP:  Recent Labs   Lab 11/09/22 0254   GLU 91   *   K 4.1      CO2 25   BUN 22   CREATININE 1.2   CALCIUM 8.6*     LFT:  Lab Results   Component Value Date     (H) 11/09/2022    ALKPHOS 254 (H) 11/09/2022    BILITOT 4.6 (H) 11/09/2022     Albumin:   Albumin   Date Value Ref Range Status   11/09/2022 2.2 (L) 3.5 - 5.2 g/dL Final     Protein:   Total Protein   Date Value Ref Range Status   11/09/2022 5.3 (L) 6.0 - 8.4 g/dL Final     Lactic acid:   Lab Results   Component Value Date    LACTATE 1.3 11/07/2022       Significant Imaging: I have reviewed all pertinent imaging results/findings within the past 24 hours.

## 2022-11-09 NOTE — DISCHARGE SUMMARY
[facility] Medicine  Discharge Summary      Patient Name: Corey Esquivel  MRN: [id number]  SALINA: [id number]  Patient Class: OP- Observation  Admission Date: 11/7/2022  Hospital Length of Stay: 0 days  Discharge Date and Time:  11/09/2022 12:31 PM  Attending Physician: Richard Wilson, *   Discharging Provider: Richard Wilson MD  Primary Care Provider: Niecy Davis MD    Primary Care Team: Networked reference to record PCT     HPI:   Corey Esquivel is pleasant 81 yo male with no significant medical history who presents to ED complaining of worseing short of breath, CHANDRA and chest tightness for 2-3 months. He stated that he gets tired easily. 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 He feels uncomfortable when takes deep breath. He also complains diffuse itching all over body that started few weeks ago.   He denies fever, weight changes, constipation, diarrhea, blood in stool or urine.     On admission, his vital signs are unremarkable.   Lab results are remarkable for elevated total bili 4.0, elevated alk phosphatase 315 and elevtaed , and ALT 75. Negative initial Trop. Bnp wnl.     Liver US with doppler shows findings concerning for at least partially thrombosed intrahepatic main portal vein near the confluence and right portal vein (with reversed flow). Cirrhosis with sequela of portal hypertension to include splenomegaly and mild upper abdominal ascites.  Indeterminate lesion in the right hepatic lobe    Cxr shows no acute pulmonary abnorlaity   EKG show sinus rhythm with new incomplete rbbb and criteria for septal infarct, age undetermine.       * No surgery found *      Hospital Course:   Mr. Esquivel presented with progressive fatigue and shortness of breath. Found to have new onset cirrhosis with ascites and portal vein thrombosis. Additional imaging with MRI concerning for multifocal HCC. AFP 3003; bilirubin 4.6. Discussed with Heme/Onc  "and GI; given multifocal nature of disease, may be very limited in therapeutic options. No benefit to stenting per GI. Will start ursodiol. Follow up with Oncology, Hepatology, and Palliative as outpatient. Discussed with patient and family at the bedside.    BP (!) 159/77 (BP Location: Right arm, Patient Position: Sitting)   Pulse 73   Temp 97.9 °F (36.6 °C) (Oral)   Resp 18   Ht 6' 4" (1.93 m)   Wt 118.3 kg (260 lb 12.9 oz)   SpO2 (!) 94%   BMI 31.75 kg/m²   Physical Exam  Constitutional:       Appearance: Normal appearance.   HENT:      Head: Normocephalic and atraumatic.      Nose: Nose normal.   Eyes:      General: Scleral icterus present.      Extraocular Movements: Extraocular movements intact.      Pupils: Pupils are equal, round, and reactive to light.   Cardiovascular:      Rate and Rhythm: Normal rate and regular rhythm.      Heart sounds: Normal heart sounds.   Pulmonary:      Effort: Pulmonary effort is normal.      Breath sounds: Normal breath sounds.   Abdominal:      General: There is distension.      Tenderness: There is no abdominal tenderness. There is no right CVA tenderness, left CVA tenderness, guarding or rebound.   Musculoskeletal:         General: Normal range of motion.      Cervical back: Normal range of motion.   Skin:     General: Skin is warm and dry.   Neurological:      General: No focal deficit present.      Mental Status: He is alert.   Psychiatric:         Mood and Affect: Mood normal.         Behavior: Behavior normal.        Goals of Care Treatment Preferences:  Code Status: Full Code      Consults:   Consults (From admission, onward)        Status Ordering Provider     Inpatient consult to Palliative Care  Once        Provider:  (Not yet assigned)    Acknowledged WILSON FARIAS     Inpatient consult to Telemedicine - Oncology  Once        Provider:  (Not yet assigned)    Completed SCOTT DELEON     Inpatient consult to Interventional Radiology  Once      "   Provider:  (Not yet assigned)    Completed WILSON FARIAS     Inpatient consult to Gastroenterology-[facility]  Once        Provider:  (Not yet assigned)    Completed MIRNA CHUA          No new Assessment & Plan notes have been filed under this hospital service since the last note was generated.  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 [C22.0, I81] 11/07/2022 Yes    HCC (hepatocellular carcinoma) [C22.0] 11/08/2022 Yes    Transaminitis [R74.01] 11/07/2022 Yes    Cirrhosis of liver with ascites [K74.60, R18.8] 11/07/2022 Yes    Mixed hyperlipidemia [E78.2] 10/31/2019 Yes      Problems Resolved During this Admission:       Discharged Condition: fair    Disposition: Home or Self Care    Follow Up:    Patient Instructions:      Ambulatory referral/consult to Hepatology   Standing Status: Future   Referral Priority: Routine Referral Type: Consultation   Referral Reason: Specialty Services Required   Requested Specialty: Hepatology   Number of Visits Requested: 1     Ambulatory referral/consult to Hematology / Oncology   Standing Status: Future   Referral Priority: Routine Referral Type: Consultation   Referral Reason: Specialty Services Required   Requested Specialty: Hematology and Oncology   Number of Visits Requested: 1     Diet Adult Regular     Order Specific Question Answer Comments   Na restriction, if any: 2gNa      Diet Adult Regular     Notify your health care provider if you experience any of the following:  temperature >100.4     Notify your health care provider if you experience any of the following:  persistent nausea and vomiting or diarrhea     Notify your health care provider if you experience any of the following:  severe uncontrolled pain     Notify your health care provider if you experience any of the following:  difficulty breathing or increased cough     Notify your  health care provider if you experience any of the following:  severe persistent headache     Notify your health care provider if you experience any of the following:  persistent dizziness, light-headedness, or visual disturbances     Notify your health care provider if you experience any of the following:  increased confusion or weakness     Notify your health care provider if you experience any of the following:  temperature >100.4     Notify your health care provider if you experience any of the following:  persistent nausea and vomiting or diarrhea     Notify your health care provider if you experience any of the following:  severe uncontrolled pain     Notify your health care provider if you experience any of the following:  difficulty breathing or increased cough     Notify your health care provider if you experience any of the following:  severe persistent headache     Notify your health care provider if you experience any of the following:  persistent dizziness, light-headedness, or visual disturbances     Notify your health care provider if you experience any of the following:  increased confusion or weakness     Activity as tolerated     Activity as tolerated       Significant Diagnostic Studies: see above    Pending Diagnostic Studies:     Procedure Component Value Units Date/Time    Cytology, Fluid/Wash/Brush [[id number]] Collected: 11/08/22 0955    Order Status: Sent Lab Status: In process Updated: 11/08/22 1050    Specimen: Body Fluid          Medications:  Reconciled Home Medications:      Medication List      START taking these medications    ursodioL 300 mg capsule  Commonly known as: ACTIGALL  Take 1 capsule (300 mg total) by mouth 2 (two) times daily.        CONTINUE taking these medications    GLUCOSAM-CHONDR-MSM WITH VIT D ORAL  Take by mouth.     hydroCHLOROthiazide 25 MG tablet  Commonly known as: HYDRODIURIL  Take 25 mg by mouth once daily.     lisinopriL 10 MG tablet  Take 10 mg by mouth once  daily.     ZYRTEC-D ORAL  Take by mouth.            Indwelling Lines/Drains at time of discharge:   Lines/Drains/Airways     None                 Time spent on the discharge of patient: 34 minutes         Richard Wilson MD  Department of [facility] Medicine  [facility]

## 2022-11-11 LAB — BACTERIA SPEC AEROBE CULT: NO GROWTH

## 2022-11-14 ENCOUNTER — PATIENT MESSAGE (OUTPATIENT)
Dept: HEMATOLOGY/ONCOLOGY | Facility: CLINIC | Age: 82
End: 2022-11-14
Payer: MEDICARE

## 2022-11-14 ENCOUNTER — TELEPHONE (OUTPATIENT)
Dept: HEMATOLOGY/ONCOLOGY | Facility: CLINIC | Age: 82
End: 2022-11-14
Payer: MEDICARE

## 2022-11-14 LAB
FINAL PATHOLOGIC DIAGNOSIS: NORMAL
Lab: NORMAL

## 2022-11-14 NOTE — PROGRESS NOTES
PATIENT: Corey Esquivel  MRN: [id number]  DATE: 11/15/2022    Diagnosis:   1. Hepatocellular carcinoma    2. Elevated liver function tests    3. Hyperbilirubinemia    4. Cirrhosis of liver with ascites, unspecified hepatic cirrhosis type    5. Advance care planning    6. Portal vein thrombosis    7. Chronic neoplasm-related pain      Chief Complaint: Hepatocellular Carcinoma    Oncologic History:      Oncologic History Hepatocellular carcinoma      Oncologic Treatment To be determined, unclear if candidate for systemic therapy.    Pathology       Subjective:    History of Present Illness: Mr. Esquivel is a 82 y.o. male who presents for evaluation and management of hepatocellular carcinoma. I had seen him during a hospitalization in November 2022.    He was admitted in November 2022 for portal vein thrombosis/cirrhosis/hyperbilirubinemia. Imaging was concerning for multifocal hepatocellular carcinoma.    Interval history:  - he presents for a follow-up appointment for his hepatocellular carcinoma  - he endorses fatigue, pruritis, jaundice, abdominal pain/distention.  - he is scheduled to see an outside hepatologist on 11/17/22.      Past medical, surgical, family, and social histories have been reviewed and updated below.    Past Medical History: No past medical history on file.    Past Surgical History:   Past Surgical History:   Procedure Laterality Date    ABDOMINAL SURGERY      CHOLECYSTECTOMY      HERNIA REPAIR      SHOULDER SURGERY         Family History: No family history on file.    Social History:  reports that he has quit smoking. He has quit using smokeless tobacco. He reports that he does not drink alcohol and does not use drugs.    Allergies:  Review of patient's allergies indicates:  No Known Allergies    Medications:  Current Outpatient Medications   Medication Sig Dispense Refill    cetirizine HCl/pseudoephedrine (ZYRTEC-D ORAL) Take by mouth.      GLUC/MICHAEL-MSM#2/C/D3/MARK/BORN (GLUCOSAM-CHONDR-MSM WITH VIT D  ORAL) Take by mouth.      hydroCHLOROthiazide (HYDRODIURIL) 25 MG tablet Take 25 mg by mouth once daily.      hydrOXYzine HCL (ATARAX) 25 MG tablet Take 1 tablet (25 mg total) by mouth 3 (three) times daily as needed for Anxiety. 30 tablet 1    lisinopriL 10 MG tablet Take 10 mg by mouth once daily.      ursodioL (ACTIGALL) 300 mg capsule Take 1 capsule (300 mg total) by mouth 2 (two) times daily. 60 capsule 11     No current facility-administered medications for this visit.       Review of Systems   Constitutional:  Positive for fatigue and unexpected weight change.   HENT:  Negative for sore throat.    Eyes:  Negative for visual disturbance.   Respiratory:  Negative for shortness of breath.    Cardiovascular:  Negative for chest pain.   Gastrointestinal:  Positive for abdominal distention and abdominal pain.   Genitourinary:  Negative for dysuria.   Musculoskeletal:  Negative for back pain.   Skin:  Negative for rash.   Neurological:  Positive for weakness. Negative for headaches.   Hematological:  Negative for adenopathy.   Psychiatric/Behavioral:  The patient is not nervous/anxious.      ECOG Performance Status:   ECOG SCORE 1            Objective:      Vitals:   Vitals:    11/15/22 1426   BP: (!) 141/63   Pulse: 87   Weight: 117.7 kg (259 lb 7.7 oz)     BMI: Body mass index is 31.59 kg/m².    Physical Exam  Vitals and nursing note reviewed.   Constitutional:       Appearance: He is well-developed.   HENT:      Head: Normocephalic and atraumatic.   Eyes:      Pupils: Pupils are equal, round, and reactive to light.   Cardiovascular:      Rate and Rhythm: Normal rate and regular rhythm.   Pulmonary:      Effort: Pulmonary effort is normal.      Breath sounds: Normal breath sounds.   Abdominal:      General: Bowel sounds are normal.      Palpations: Abdomen is soft.   Musculoskeletal:         General: Normal range of motion.      Cervical back: Normal range of motion and neck supple.   Skin:     General: Skin is  warm and dry.   Neurological:      Mental Status: He is alert and oriented to person, place, and time.   Psychiatric:         Behavior: Behavior normal.         Thought Content: Thought content normal.         Judgment: Judgment normal.       Laboratory Data:  Labs have been reviewed.    Lab Results   Component Value Date    WBC 7.55 11/15/2022    HGB 13.1 (L) 11/15/2022    HCT 37.9 (L) 11/15/2022    MCV 96 11/15/2022     11/15/2022       CMP  Sodium   Date Value Ref Range Status   11/15/2022 129 (L) 136 - 145 mmol/L Final     Potassium   Date Value Ref Range Status   11/15/2022 5.0 3.5 - 5.1 mmol/L Final     Comment:     Specimen moderately hemolyzed     Chloride   Date Value Ref Range Status   11/15/2022 96 95 - 110 mmol/L Final     CO2   Date Value Ref Range Status   11/15/2022 24 23 - 29 mmol/L Final     Glucose   Date Value Ref Range Status   11/15/2022 132 (H) 70 - 110 mg/dL Final     BUN   Date Value Ref Range Status   11/15/2022 30 (H) 8 - 23 mg/dL Final     Creatinine   Date Value Ref Range Status   11/15/2022 1.4 0.5 - 1.4 mg/dL Final     Calcium   Date Value Ref Range Status   11/15/2022 9.3 8.7 - 10.5 mg/dL Final     Total Protein   Date Value Ref Range Status   11/15/2022 6.5 6.0 - 8.4 g/dL Final     Comment:     Specimen moderately hemolyzed     Albumin   Date Value Ref Range Status   11/15/2022 2.4 (L) 3.5 - 5.2 g/dL Final     Total Bilirubin   Date Value Ref Range Status   11/15/2022 6.0 (H) 0.1 - 1.0 mg/dL Final     Comment:     For infants and newborns, interpretation of results should be based  on gestational age, weight and in agreement with clinical  observations.    Premature Infant recommended reference ranges:  Up to 24 hours.............<8.0 mg/dL  Up to 48 hours............<12.0 mg/dL  3-5 days..................<15.0 mg/dL  6-29 days.................<15.0 mg/dL       Alkaline Phosphatase   Date Value Ref Range Status   11/15/2022 326 (H) 55 - 135 U/L Final     AST   Date Value Ref  Range Status   11/15/2022 227 (H) 10 - 40 U/L Final     Comment:     Specimen moderately hemolyzed     ALT   Date Value Ref Range Status   11/15/2022 100 (H) 10 - 44 U/L Final     Anion Gap   Date Value Ref Range Status   11/15/2022 9 8 - 16 mmol/L Final     eGFR   Date Value Ref Range Status   11/15/2022 50 (A) >60 mL/min/1.73 m^2 Final         Imaging:    MRI abdomen (11/8/22): I have personally reviewed the images  Cirrhosis and stigmata of portal hypertension, with suspected HCC in the posterior right hepatic lobe.     Acute thrombosis of the intrahepatic right portal vein and attenuation of left portal vein intrahepatic component may be related to chronic thrombosis or mass effect related to surrounding fibrosis.     Diffusely altered hepatic perfusion, perhaps related to the portal venous abnormalities versus infiltrative neoplasm.      Assessment:       1. Hepatocellular carcinoma    2. Elevated liver function tests    3. Hyperbilirubinemia    4. Cirrhosis of liver with ascites, unspecified hepatic cirrhosis type    5. Advance care planning           Plan:     Hepatocellular carcinoma  - I have reviewed his chart  - MRI abdomen (11/8/22) reveals a 3.5cm mass in the right lobe of the liver, concerning for hepatocellular carcinoma.  - I have spoken with interventional radiology. The concern is that he has multifocal hepatocellular carcinoma in the left lobe of the liver, causing liver failure  - his Child-Young score is C (10 points). This suggests he is not a candidate for systemic therapy and that he may not benefit from systemic therapy..  - he has been taking ursodiol, but his bilirubin continues to increase.  - he is scheduled to see an outside hepatologist on 11/17/22.  - I discussed his case with interventional radiology and gastroenterology. They state there is no role for liver-directed therapy, ERCP/stent, percutaneous drain.  - patient and family would like a second oncology opinion. I will refer to  GI oncologist at [facility].  - Labs have been reviewed. Bilirubin has increased to 6 mg/dL.  - I will attempt to get systemic therapy approved. We can try lenvatinib, that may have some data in patients with Child Young C score.  - return to clinic in 2 weeks.    2. Elevated liver function tests / hyperbilirubinemia  - Labs have been reviewed. Liver function tests have worsened.  - I discussed his case with interventional radiology and gastroenterology. They state there is no role for liver-directed therapy, ERCP/stent, percutaneous drain.  - see above    3. Chronic neoplasm-related pain  - I sent low-dose oxycodone to his pharmacy  - continue to monitor    4. Advance Care Planning     Date: 11/15/2022    Power of   I initiated the process of advance care planning today and explained the importance of this process to the patient.  I introduced the concept of advance directives to the patient, as well. Then the patient received detailed information about the importance of designating a Health Care Power of  (HCPOA). He was also instructed to communicate with this person about their wishes for future healthcare, should he become sick and lose decision-making capacity. The patient has not previously appointed a HCPOA. After our discussion, the patient has decided to complete a HCPOA and has appointed his  wife Eliane Esquivel ([phone]) and daughter Leigh Ann Atkinson ([phone]) . I spent a total time of 16 minutes discussing this issue with the patient.                  Quinn Small M.D.  Hematology/Oncology  [facility] - [address], [address]  [place] [address]  Phone: [phone]  Fax: [phone]

## 2022-11-15 ENCOUNTER — TELEPHONE (OUTPATIENT)
Dept: HEMATOLOGY/ONCOLOGY | Facility: CLINIC | Age: 82
End: 2022-11-15

## 2022-11-15 ENCOUNTER — OFFICE VISIT (OUTPATIENT)
Dept: HEMATOLOGY/ONCOLOGY | Facility: CLINIC | Age: 82
End: 2022-11-15
Payer: MEDICARE

## 2022-11-15 ENCOUNTER — LAB VISIT (OUTPATIENT)
Dept: LAB | Facility: HOSPITAL | Age: 82
End: 2022-11-15
Attending: INTERNAL MEDICINE
Payer: MEDICARE

## 2022-11-15 ENCOUNTER — PATIENT MESSAGE (OUTPATIENT)
Dept: HEMATOLOGY/ONCOLOGY | Facility: CLINIC | Age: 82
End: 2022-11-15

## 2022-11-15 ENCOUNTER — TELEPHONE (OUTPATIENT)
Dept: HEPATOLOGY | Facility: CLINIC | Age: 82
End: 2022-11-15
Payer: MEDICARE

## 2022-11-15 VITALS
DIASTOLIC BLOOD PRESSURE: 63 MMHG | HEART RATE: 87 BPM | BODY MASS INDEX: 31.59 KG/M2 | SYSTOLIC BLOOD PRESSURE: 141 MMHG | WEIGHT: 259.5 LBS

## 2022-11-15 DIAGNOSIS — E80.6 HYPERBILIRUBINEMIA: ICD-10-CM

## 2022-11-15 DIAGNOSIS — R18.8 CIRRHOSIS OF LIVER WITH ASCITES, UNSPECIFIED HEPATIC CIRRHOSIS TYPE: ICD-10-CM

## 2022-11-15 DIAGNOSIS — I81 PORTAL VEIN THROMBOSIS: Primary | ICD-10-CM

## 2022-11-15 DIAGNOSIS — K74.60 CIRRHOSIS OF LIVER WITH ASCITES, UNSPECIFIED HEPATIC CIRRHOSIS TYPE: ICD-10-CM

## 2022-11-15 DIAGNOSIS — C22.0 HEPATOCELLULAR CARCINOMA: Primary | ICD-10-CM

## 2022-11-15 DIAGNOSIS — Z71.89 ADVANCE CARE PLANNING: ICD-10-CM

## 2022-11-15 DIAGNOSIS — N18.31 CHRONIC KIDNEY DISEASE, STAGE 3A: ICD-10-CM

## 2022-11-15 DIAGNOSIS — G89.3 CHRONIC NEOPLASM-RELATED PAIN: ICD-10-CM

## 2022-11-15 DIAGNOSIS — I81 PORTAL VEIN THROMBOSIS: ICD-10-CM

## 2022-11-15 DIAGNOSIS — C22.0 HEPATOCELLULAR CARCINOMA: ICD-10-CM

## 2022-11-15 DIAGNOSIS — R79.89 ELEVATED LIVER FUNCTION TESTS: ICD-10-CM

## 2022-11-15 LAB
ALBUMIN SERPL BCP-MCNC: 2.4 G/DL (ref 3.5–5.2)
ALP SERPL-CCNC: 326 U/L (ref 55–135)
ALT SERPL W/O P-5'-P-CCNC: 100 U/L (ref 10–44)
ANION GAP SERPL CALC-SCNC: 9 MMOL/L (ref 8–16)
AST SERPL-CCNC: 227 U/L (ref 10–40)
BACTERIA SPEC ANAEROBE CULT: NORMAL
BASOPHILS # BLD AUTO: 0.06 K/UL (ref 0–0.2)
BASOPHILS NFR BLD: 0.8 % (ref 0–1.9)
BILIRUB SERPL-MCNC: 6 MG/DL (ref 0.1–1)
BUN SERPL-MCNC: 30 MG/DL (ref 8–23)
CALCIUM SERPL-MCNC: 9.3 MG/DL (ref 8.7–10.5)
CHLORIDE SERPL-SCNC: 96 MMOL/L (ref 95–110)
CO2 SERPL-SCNC: 24 MMOL/L (ref 23–29)
CREAT SERPL-MCNC: 1.4 MG/DL (ref 0.5–1.4)
DIFFERENTIAL METHOD: ABNORMAL
EOSINOPHIL # BLD AUTO: 0.2 K/UL (ref 0–0.5)
EOSINOPHIL NFR BLD: 2.1 % (ref 0–8)
ERYTHROCYTE [DISTWIDTH] IN BLOOD BY AUTOMATED COUNT: 17.5 % (ref 11.5–14.5)
EST. GFR  (NO RACE VARIABLE): 50 ML/MIN/1.73 M^2
GLUCOSE SERPL-MCNC: 132 MG/DL (ref 70–110)
HCT VFR BLD AUTO: 37.9 % (ref 40–54)
HGB BLD-MCNC: 13.1 G/DL (ref 14–18)
IMM GRANULOCYTES # BLD AUTO: 0.07 K/UL (ref 0–0.04)
IMM GRANULOCYTES NFR BLD AUTO: 0.9 % (ref 0–0.5)
INR PPP: 1.1 (ref 0.8–1.2)
LYMPHOCYTES # BLD AUTO: 2.5 K/UL (ref 1–4.8)
LYMPHOCYTES NFR BLD: 32.8 % (ref 18–48)
MCH RBC QN AUTO: 33.2 PG (ref 27–31)
MCHC RBC AUTO-ENTMCNC: 34.6 G/DL (ref 32–36)
MCV RBC AUTO: 96 FL (ref 82–98)
MONOCYTES # BLD AUTO: 0.9 K/UL (ref 0.3–1)
MONOCYTES NFR BLD: 12.1 % (ref 4–15)
NEUTROPHILS # BLD AUTO: 3.9 K/UL (ref 1.8–7.7)
NEUTROPHILS NFR BLD: 51.3 % (ref 38–73)
NRBC BLD-RTO: 0 /100 WBC
PLATELET # BLD AUTO: 246 K/UL (ref 150–450)
PMV BLD AUTO: 11.2 FL (ref 9.2–12.9)
POTASSIUM SERPL-SCNC: 5 MMOL/L (ref 3.5–5.1)
PROT SERPL-MCNC: 6.5 G/DL (ref 6–8.4)
PROTHROMBIN TIME: 11.6 SEC (ref 9–12.5)
RBC # BLD AUTO: 3.94 M/UL (ref 4.6–6.2)
SODIUM SERPL-SCNC: 129 MMOL/L (ref 136–145)
WBC # BLD AUTO: 7.55 K/UL (ref 3.9–12.7)

## 2022-11-15 PROCEDURE — 1101F PR PT FALLS ASSESS DOC 0-1 FALLS W/OUT INJ PAST YR: ICD-10-PCS | Mod: CPTII,S$GLB,, | Performed by: INTERNAL MEDICINE

## 2022-11-15 PROCEDURE — 1158F ADVNC CARE PLAN TLK DOCD: CPT | Mod: CPTII,S$GLB,, | Performed by: INTERNAL MEDICINE

## 2022-11-15 PROCEDURE — 3288F FALL RISK ASSESSMENT DOCD: CPT | Mod: CPTII,S$GLB,, | Performed by: INTERNAL MEDICINE

## 2022-11-15 PROCEDURE — 3077F PR MOST RECENT SYSTOLIC BLOOD PRESSURE >= 140 MM HG: ICD-10-PCS | Mod: CPTII,S$GLB,, | Performed by: INTERNAL MEDICINE

## 2022-11-15 PROCEDURE — 1101F PT FALLS ASSESS-DOCD LE1/YR: CPT | Mod: CPTII,S$GLB,, | Performed by: INTERNAL MEDICINE

## 2022-11-15 PROCEDURE — 99215 PR OFFICE/OUTPT VISIT, EST, LEVL V, 40-54 MIN: ICD-10-PCS | Mod: S$GLB,,, | Performed by: INTERNAL MEDICINE

## 2022-11-15 PROCEDURE — 1126F AMNT PAIN NOTED NONE PRSNT: CPT | Mod: CPTII,S$GLB,, | Performed by: INTERNAL MEDICINE

## 2022-11-15 PROCEDURE — 1159F MED LIST DOCD IN RCRD: CPT | Mod: CPTII,S$GLB,, | Performed by: INTERNAL MEDICINE

## 2022-11-15 PROCEDURE — 99497 PR ADVNCD CARE PLAN 30 MIN: ICD-10-PCS | Mod: S$GLB,,, | Performed by: INTERNAL MEDICINE

## 2022-11-15 PROCEDURE — 1158F PR ADVANCE CARE PLANNING DISCUSS DOCUMENTED IN MEDICAL RECORD: ICD-10-PCS | Mod: CPTII,S$GLB,, | Performed by: INTERNAL MEDICINE

## 2022-11-15 PROCEDURE — 3077F SYST BP >= 140 MM HG: CPT | Mod: CPTII,S$GLB,, | Performed by: INTERNAL MEDICINE

## 2022-11-15 PROCEDURE — 3078F PR MOST RECENT DIASTOLIC BLOOD PRESSURE < 80 MM HG: ICD-10-PCS | Mod: CPTII,S$GLB,, | Performed by: INTERNAL MEDICINE

## 2022-11-15 PROCEDURE — 99999 PR PBB SHADOW E&M-EST. PATIENT-LVL III: ICD-10-PCS | Mod: PBBFAC,,, | Performed by: INTERNAL MEDICINE

## 2022-11-15 PROCEDURE — 99999 PR PBB SHADOW E&M-EST. PATIENT-LVL III: CPT | Mod: PBBFAC,,, | Performed by: INTERNAL MEDICINE

## 2022-11-15 PROCEDURE — 85610 PROTHROMBIN TIME: CPT | Performed by: INTERNAL MEDICINE

## 2022-11-15 PROCEDURE — 85025 COMPLETE CBC W/AUTO DIFF WBC: CPT | Performed by: INTERNAL MEDICINE

## 2022-11-15 PROCEDURE — 1126F PR PAIN SEVERITY QUANTIFIED, NO PAIN PRESENT: ICD-10-PCS | Mod: CPTII,S$GLB,, | Performed by: INTERNAL MEDICINE

## 2022-11-15 PROCEDURE — 80053 COMPREHEN METABOLIC PANEL: CPT | Performed by: INTERNAL MEDICINE

## 2022-11-15 PROCEDURE — 1160F RVW MEDS BY RX/DR IN RCRD: CPT | Mod: CPTII,S$GLB,, | Performed by: INTERNAL MEDICINE

## 2022-11-15 PROCEDURE — 99215 OFFICE O/P EST HI 40 MIN: CPT | Mod: S$GLB,,, | Performed by: INTERNAL MEDICINE

## 2022-11-15 PROCEDURE — 3078F DIAST BP <80 MM HG: CPT | Mod: CPTII,S$GLB,, | Performed by: INTERNAL MEDICINE

## 2022-11-15 PROCEDURE — 36415 COLL VENOUS BLD VENIPUNCTURE: CPT | Performed by: INTERNAL MEDICINE

## 2022-11-15 PROCEDURE — 1159F PR MEDICATION LIST DOCUMENTED IN MEDICAL RECORD: ICD-10-PCS | Mod: CPTII,S$GLB,, | Performed by: INTERNAL MEDICINE

## 2022-11-15 PROCEDURE — 99497 ADVNCD CARE PLAN 30 MIN: CPT | Mod: S$GLB,,, | Performed by: INTERNAL MEDICINE

## 2022-11-15 PROCEDURE — 3288F PR FALLS RISK ASSESSMENT DOCUMENTED: ICD-10-PCS | Mod: CPTII,S$GLB,, | Performed by: INTERNAL MEDICINE

## 2022-11-15 PROCEDURE — 1160F PR REVIEW ALL MEDS BY PRESCRIBER/CLIN PHARMACIST DOCUMENTED: ICD-10-PCS | Mod: CPTII,S$GLB,, | Performed by: INTERNAL MEDICINE

## 2022-11-15 RX ORDER — OXYCODONE HYDROCHLORIDE 5 MG/1
5 TABLET ORAL EVERY 4 HOURS PRN
Qty: 40 TABLET | Refills: 0 | Status: SHIPPED | OUTPATIENT
Start: 2022-11-15

## 2022-11-15 NOTE — PLAN OF CARE
DISCONTINUE ON PATHWAY REGIMEN - Other            START OFF PATHWAY REGIMEN - Other            Lenvatinib (Lenvima)           Additional Orders: Monitor blood pressure, liver function tests, and for   proteinuria before initiation and periodically throughout treatment; monitor   for signs/symptoms of cardiac decompensation, ECHO as clinically indicated, and   baseline/periodic ECGs for select patients, see prescribing information for   details. Recommended monthly monitoring includes TSH, calcium, and electrolytes.    **Always confirm dose/schedule in your pharmacy ordering system**    Patient Characteristics:  Intent of Therapy:  Non-Curative / Palliative Intent, Discussed with Patient

## 2022-11-15 NOTE — Clinical Note
Good afternoon,  This is an 82 M with newly diagnosed HCC based off imaging and AFP elevation. He's got lots of involvement of left lobe per interventional radiology. I discussed his case with interventional radiology and gastroenterology. They state there is no role for liver-directed therapy, ERCP/stent, percutaneous drain. His bili today is 6 mg/dL and rising despite ursodiol. They wanted a second opinion. Would you be able to see them in the next week or two? I see some data on lenvatinib in Child Young C, so I can send that prescription off, but not sure if that will change the overall process.  Thanks!

## 2022-11-15 NOTE — TELEPHONE ENCOUNTER
----- Message from Tiesha Hensley RN sent at 11/15/2022  3:44 PM CST -----  Regarding: FW: HFU    ----- Message -----  From: Felicitas Woods  Sent: 11/9/2022  10:47 AM CST  To: [facility] Hepat Clinical Staff  Subject: HFU                                              Patient will be discharging from [facility] today.  Yesterday I sent IB to Dr Carrion to get patient scheduled for proximity to home. They scheduled patient but in [place].  This morning I received a request by patient's daughter for patient to be scheduled with one of these providers listed. She researched Hepatology doctors at [facility].    Dr Surjit Jones    DX: Liver Mass    Please schedule one of these doctors a sooner appointment and message me back to advise DC Nurse.     Thanks, Augustina  Access Navigator/Discharge

## 2022-11-16 ENCOUNTER — TELEPHONE (OUTPATIENT)
Dept: HEMATOLOGY/ONCOLOGY | Facility: CLINIC | Age: 82
End: 2022-11-16
Payer: MEDICARE

## 2022-11-16 ENCOUNTER — SPECIALTY PHARMACY (OUTPATIENT)
Dept: PHARMACY | Facility: CLINIC | Age: 82
End: 2022-11-16
Payer: MEDICARE

## 2022-11-16 NOTE — TELEPHONE ENCOUNTER
BI: COMPLETE     MEDICARE PLAN: [employer]   Estimated copay: $3,391.30  Benefits Stage: initial   In Network: yes   PA approval on file: yes   LIS level: none     Forwarding to FA for further assistance.

## 2022-11-16 NOTE — TELEPHONE ENCOUNTER
Secured [employer] thom for Lenvima and patient will pay $0 with thom on file. Forwarding to assigned [facility] for initial consult.     FOR DOCUMENTATION ONLY:  Financial Assistance for Lenvima approved from 11/16/22 to 11/16/23  Source: [employer]  BIN: [id number]  PCN: [id number]  Id: [id number]  GRP: [facility]  Thom Amount: $7500  Annual Max: $51475

## 2022-11-16 NOTE — TELEPHONE ENCOUNTER
Sent a staff message to MDO regarding diagnosis verification form for [facility] and faxed form to [phone] for provider review and signature. [facility] 90 days from the effective start date of 11/16/22.

## 2022-11-16 NOTE — PLAN OF CARE
DISCONTINUE OFF PATHWAY REGIMEN - Other            Lenvatinib (Lenvima)           Additional Orders: Monitor blood pressure, liver function tests, and for   proteinuria before initiation and periodically throughout treatment; monitor   for signs/symptoms of cardiac decompensation, ECHO as clinically indicated, and   baseline/periodic ECGs for select patients, see prescribing information for   details. Recommended monthly monitoring includes TSH, calcium, and electrolytes.    **Always confirm dose/schedule in your pharmacy ordering system**    REASON: Other Reason  PRIOR TREATMENT:   TREATMENT RESPONSE: Unable to Evaluate    START OFF PATHWAY REGIMEN - Other            Nivolumab (Opdivo)           Additional Orders: Serious immune-mediated adverse events can occur with   nivolumab. Please monitor your patient and refer to the linked immune-mediated   adverse reaction management materials for more information.    **Always confirm dose/schedule in your pharmacy ordering system**    Patient Characteristics:  Intent of Therapy:  Non-Curative / Palliative Intent, Discussed with Patient

## 2022-11-16 NOTE — TELEPHONE ENCOUNTER
Outgoing call to patient regarding Lenvima prescription we received. Informed patient and his wife that Lenvima has been approved through insurance with a $3,391.30 copay and discussed FA options. Thom funding available currently. Patient provided HH size and annual income and gave permission for [facility] to apply for thom on his behalf. [facility] will be back in touch once FA is complete. Patient and wife voiced understanding.

## 2022-11-16 NOTE — TELEPHONE ENCOUNTER
PA approved. Case ID#[id number]. Approved 11/16/22-05/15/23. Estimated copay is $3,391.30. forwarding to BI/FA for further assistance.

## 2022-11-17 ENCOUNTER — PATIENT MESSAGE (OUTPATIENT)
Dept: HEMATOLOGY/ONCOLOGY | Facility: CLINIC | Age: 82
End: 2022-11-17
Payer: MEDICARE

## 2022-11-17 ENCOUNTER — TELEPHONE (OUTPATIENT)
Dept: HEMATOLOGY/ONCOLOGY | Facility: CLINIC | Age: 82
End: 2022-11-17
Payer: MEDICARE

## 2022-11-17 ENCOUNTER — OFFICE VISIT (OUTPATIENT)
Dept: HEPATOLOGY | Facility: CLINIC | Age: 82
End: 2022-11-17
Payer: MEDICARE

## 2022-11-17 ENCOUNTER — LAB VISIT (OUTPATIENT)
Dept: LAB | Facility: HOSPITAL | Age: 82
End: 2022-11-17
Attending: INTERNAL MEDICINE
Payer: MEDICARE

## 2022-11-17 ENCOUNTER — SPECIALTY PHARMACY (OUTPATIENT)
Dept: PHARMACY | Facility: CLINIC | Age: 82
End: 2022-11-17
Payer: MEDICARE

## 2022-11-17 ENCOUNTER — TELEPHONE (OUTPATIENT)
Dept: HEPATOLOGY | Facility: CLINIC | Age: 82
End: 2022-11-17

## 2022-11-17 VITALS
WEIGHT: 253.81 LBS | BODY MASS INDEX: 30.91 KG/M2 | SYSTOLIC BLOOD PRESSURE: 143 MMHG | OXYGEN SATURATION: 96 % | TEMPERATURE: 99 F | DIASTOLIC BLOOD PRESSURE: 64 MMHG | HEART RATE: 85 BPM | HEIGHT: 76 IN | RESPIRATION RATE: 19 BRPM

## 2022-11-17 DIAGNOSIS — C22.0 HEPATOCELLULAR CARCINOMA: Primary | ICD-10-CM

## 2022-11-17 DIAGNOSIS — R18.8 CIRRHOSIS OF LIVER WITH ASCITES, UNSPECIFIED HEPATIC CIRRHOSIS TYPE: ICD-10-CM

## 2022-11-17 DIAGNOSIS — L29.9 PRURITUS: Primary | ICD-10-CM

## 2022-11-17 DIAGNOSIS — L29.9 PRURITUS: ICD-10-CM

## 2022-11-17 DIAGNOSIS — K74.60 CIRRHOSIS OF LIVER WITH ASCITES, UNSPECIFIED HEPATIC CIRRHOSIS TYPE: ICD-10-CM

## 2022-11-17 LAB
AFP SERPL-MCNC: 5054 NG/ML (ref 0–8.4)
ALBUMIN SERPL BCP-MCNC: 2.6 G/DL (ref 3.5–5.2)
ALP SERPL-CCNC: 391 U/L (ref 55–135)
ALT SERPL W/O P-5'-P-CCNC: 126 U/L (ref 10–44)
ANION GAP SERPL CALC-SCNC: 12 MMOL/L (ref 8–16)
AST SERPL-CCNC: 283 U/L (ref 10–40)
BASOPHILS # BLD AUTO: 0.05 K/UL (ref 0–0.2)
BASOPHILS NFR BLD: 0.6 % (ref 0–1.9)
BILIRUB DIRECT SERPL-MCNC: 5.6 MG/DL (ref 0.1–0.3)
BILIRUB SERPL-MCNC: 8.8 MG/DL (ref 0.1–1)
BUN SERPL-MCNC: 34 MG/DL (ref 8–23)
CALCIUM SERPL-MCNC: 9.7 MG/DL (ref 8.7–10.5)
CHLORIDE SERPL-SCNC: 99 MMOL/L (ref 95–110)
CO2 SERPL-SCNC: 20 MMOL/L (ref 23–29)
CREAT SERPL-MCNC: 1.5 MG/DL (ref 0.5–1.4)
DIFFERENTIAL METHOD: ABNORMAL
EOSINOPHIL # BLD AUTO: 0.1 K/UL (ref 0–0.5)
EOSINOPHIL NFR BLD: 0.7 % (ref 0–8)
ERYTHROCYTE [DISTWIDTH] IN BLOOD BY AUTOMATED COUNT: 18.4 % (ref 11.5–14.5)
EST. GFR  (NO RACE VARIABLE): 45.9 ML/MIN/1.73 M^2
FERRITIN SERPL-MCNC: 4733 NG/ML (ref 20–300)
GLUCOSE SERPL-MCNC: 110 MG/DL (ref 70–110)
HAV IGG SER QL IA: NORMAL
HBV CORE AB SERPL QL IA: NORMAL
HBV SURFACE AB SER-ACNC: <3 MIU/ML
HBV SURFACE AB SER-ACNC: NORMAL M[IU]/ML
HBV SURFACE AG SERPL QL IA: NORMAL
HCT VFR BLD AUTO: 44 % (ref 40–54)
HCV AB SERPL QL IA: NORMAL
HGB BLD-MCNC: 15.1 G/DL (ref 14–18)
HIV 1+2 AB+HIV1 P24 AG SERPL QL IA: NORMAL
IGG SERPL-MCNC: 1715 MG/DL (ref 650–1600)
IMM GRANULOCYTES # BLD AUTO: 0.05 K/UL (ref 0–0.04)
IMM GRANULOCYTES NFR BLD AUTO: 0.6 % (ref 0–0.5)
INR PPP: 1.1 (ref 0.8–1.2)
IRON SERPL-MCNC: 60 UG/DL (ref 45–160)
LYMPHOCYTES # BLD AUTO: 1.7 K/UL (ref 1–4.8)
LYMPHOCYTES NFR BLD: 20.5 % (ref 18–48)
MCH RBC QN AUTO: 34 PG (ref 27–31)
MCHC RBC AUTO-ENTMCNC: 34.3 G/DL (ref 32–36)
MCV RBC AUTO: 99 FL (ref 82–98)
MONOCYTES # BLD AUTO: 0.9 K/UL (ref 0.3–1)
MONOCYTES NFR BLD: 10.9 % (ref 4–15)
NEUTROPHILS # BLD AUTO: 5.4 K/UL (ref 1.8–7.7)
NEUTROPHILS NFR BLD: 66.7 % (ref 38–73)
NRBC BLD-RTO: 0 /100 WBC
PLATELET # BLD AUTO: 238 K/UL (ref 150–450)
PMV BLD AUTO: 12.2 FL (ref 9.2–12.9)
POTASSIUM SERPL-SCNC: 5.2 MMOL/L (ref 3.5–5.1)
PROT SERPL-MCNC: 7.2 G/DL (ref 6–8.4)
PROTHROMBIN TIME: 11.1 SEC (ref 9–12.5)
RBC # BLD AUTO: 4.44 M/UL (ref 4.6–6.2)
SATURATED IRON: 27 % (ref 20–50)
SODIUM SERPL-SCNC: 131 MMOL/L (ref 136–145)
TOTAL IRON BINDING CAPACITY: 219 UG/DL (ref 250–450)
TRANSFERRIN SERPL-MCNC: 148 MG/DL (ref 200–375)
WBC # BLD AUTO: 8.14 K/UL (ref 3.9–12.7)

## 2022-11-17 PROCEDURE — 85025 COMPLETE CBC W/AUTO DIFF WBC: CPT | Performed by: INTERNAL MEDICINE

## 2022-11-17 PROCEDURE — 1126F PR PAIN SEVERITY QUANTIFIED, NO PAIN PRESENT: ICD-10-PCS | Mod: CPTII,S$GLB,, | Performed by: INTERNAL MEDICINE

## 2022-11-17 PROCEDURE — 82105 ALPHA-FETOPROTEIN SERUM: CPT | Performed by: INTERNAL MEDICINE

## 2022-11-17 PROCEDURE — 80321 ALCOHOLS BIOMARKERS 1OR 2: CPT | Performed by: INTERNAL MEDICINE

## 2022-11-17 PROCEDURE — 86704 HEP B CORE ANTIBODY TOTAL: CPT | Performed by: INTERNAL MEDICINE

## 2022-11-17 PROCEDURE — 86015 ACTIN ANTIBODY EACH: CPT | Performed by: INTERNAL MEDICINE

## 2022-11-17 PROCEDURE — 1101F PR PT FALLS ASSESS DOC 0-1 FALLS W/OUT INJ PAST YR: ICD-10-PCS | Mod: CPTII,S$GLB,, | Performed by: INTERNAL MEDICINE

## 2022-11-17 PROCEDURE — 82728 ASSAY OF FERRITIN: CPT | Performed by: INTERNAL MEDICINE

## 2022-11-17 PROCEDURE — 86038 ANTINUCLEAR ANTIBODIES: CPT | Performed by: INTERNAL MEDICINE

## 2022-11-17 PROCEDURE — 99204 PR OFFICE/OUTPT VISIT, NEW, LEVL IV, 45-59 MIN: ICD-10-PCS | Mod: S$GLB,,, | Performed by: INTERNAL MEDICINE

## 2022-11-17 PROCEDURE — 86706 HEP B SURFACE ANTIBODY: CPT | Performed by: INTERNAL MEDICINE

## 2022-11-17 PROCEDURE — 86381 MITOCHONDRIAL ANTIBODY EACH: CPT | Performed by: INTERNAL MEDICINE

## 2022-11-17 PROCEDURE — 82103 ALPHA-1-ANTITRYPSIN TOTAL: CPT | Mod: 91 | Performed by: INTERNAL MEDICINE

## 2022-11-17 PROCEDURE — 1159F MED LIST DOCD IN RCRD: CPT | Mod: CPTII,S$GLB,, | Performed by: INTERNAL MEDICINE

## 2022-11-17 PROCEDURE — 87340 HEPATITIS B SURFACE AG IA: CPT | Performed by: INTERNAL MEDICINE

## 2022-11-17 PROCEDURE — 1159F PR MEDICATION LIST DOCUMENTED IN MEDICAL RECORD: ICD-10-PCS | Mod: CPTII,S$GLB,, | Performed by: INTERNAL MEDICINE

## 2022-11-17 PROCEDURE — 1157F ADVNC CARE PLAN IN RCRD: CPT | Mod: CPTII,S$GLB,, | Performed by: INTERNAL MEDICINE

## 2022-11-17 PROCEDURE — 86803 HEPATITIS C AB TEST: CPT | Performed by: INTERNAL MEDICINE

## 2022-11-17 PROCEDURE — 86790 VIRUS ANTIBODY NOS: CPT | Performed by: INTERNAL MEDICINE

## 2022-11-17 PROCEDURE — 3078F PR MOST RECENT DIASTOLIC BLOOD PRESSURE < 80 MM HG: ICD-10-PCS | Mod: CPTII,S$GLB,, | Performed by: INTERNAL MEDICINE

## 2022-11-17 PROCEDURE — 82248 BILIRUBIN DIRECT: CPT | Performed by: INTERNAL MEDICINE

## 2022-11-17 PROCEDURE — 82239 BILE ACIDS TOTAL: CPT | Performed by: INTERNAL MEDICINE

## 2022-11-17 PROCEDURE — 3288F PR FALLS RISK ASSESSMENT DOCUMENTED: ICD-10-PCS | Mod: CPTII,S$GLB,, | Performed by: INTERNAL MEDICINE

## 2022-11-17 PROCEDURE — 82784 ASSAY IGA/IGD/IGG/IGM EACH: CPT | Performed by: INTERNAL MEDICINE

## 2022-11-17 PROCEDURE — 85610 PROTHROMBIN TIME: CPT | Performed by: INTERNAL MEDICINE

## 2022-11-17 PROCEDURE — 86039 ANTINUCLEAR ANTIBODIES (ANA): CPT | Performed by: INTERNAL MEDICINE

## 2022-11-17 PROCEDURE — 3077F SYST BP >= 140 MM HG: CPT | Mod: CPTII,S$GLB,, | Performed by: INTERNAL MEDICINE

## 2022-11-17 PROCEDURE — 3077F PR MOST RECENT SYSTOLIC BLOOD PRESSURE >= 140 MM HG: ICD-10-PCS | Mod: CPTII,S$GLB,, | Performed by: INTERNAL MEDICINE

## 2022-11-17 PROCEDURE — 80053 COMPREHEN METABOLIC PANEL: CPT | Performed by: INTERNAL MEDICINE

## 2022-11-17 PROCEDURE — 3078F DIAST BP <80 MM HG: CPT | Mod: CPTII,S$GLB,, | Performed by: INTERNAL MEDICINE

## 2022-11-17 PROCEDURE — 86235 NUCLEAR ANTIGEN ANTIBODY: CPT | Mod: 59 | Performed by: INTERNAL MEDICINE

## 2022-11-17 PROCEDURE — 82103 ALPHA-1-ANTITRYPSIN TOTAL: CPT | Performed by: INTERNAL MEDICINE

## 2022-11-17 PROCEDURE — 99999 PR PBB SHADOW E&M-EST. PATIENT-LVL V: ICD-10-PCS | Mod: PBBFAC,,, | Performed by: INTERNAL MEDICINE

## 2022-11-17 PROCEDURE — 1157F PR ADVANCE CARE PLAN OR EQUIV PRESENT IN MEDICAL RECORD: ICD-10-PCS | Mod: CPTII,S$GLB,, | Performed by: INTERNAL MEDICINE

## 2022-11-17 PROCEDURE — 87389 HIV-1 AG W/HIV-1&-2 AB AG IA: CPT | Performed by: INTERNAL MEDICINE

## 2022-11-17 PROCEDURE — 1126F AMNT PAIN NOTED NONE PRSNT: CPT | Mod: CPTII,S$GLB,, | Performed by: INTERNAL MEDICINE

## 2022-11-17 PROCEDURE — 36415 COLL VENOUS BLD VENIPUNCTURE: CPT | Mod: PN | Performed by: INTERNAL MEDICINE

## 2022-11-17 PROCEDURE — 99204 OFFICE O/P NEW MOD 45 MIN: CPT | Mod: S$GLB,,, | Performed by: INTERNAL MEDICINE

## 2022-11-17 PROCEDURE — 99999 PR PBB SHADOW E&M-EST. PATIENT-LVL V: CPT | Mod: PBBFAC,,, | Performed by: INTERNAL MEDICINE

## 2022-11-17 PROCEDURE — 1101F PT FALLS ASSESS-DOCD LE1/YR: CPT | Mod: CPTII,S$GLB,, | Performed by: INTERNAL MEDICINE

## 2022-11-17 PROCEDURE — 84466 ASSAY OF TRANSFERRIN: CPT | Performed by: INTERNAL MEDICINE

## 2022-11-17 PROCEDURE — 3288F FALL RISK ASSESSMENT DOCD: CPT | Mod: CPTII,S$GLB,, | Performed by: INTERNAL MEDICINE

## 2022-11-17 RX ORDER — TRAMADOL HYDROCHLORIDE 100 MG/1
100 TABLET, EXTENDED RELEASE ORAL DAILY
COMMUNITY

## 2022-11-17 RX ORDER — MONTELUKAST SODIUM 4 MG/1
1 TABLET, CHEWABLE ORAL 2 TIMES DAILY
Qty: 180 TABLET | Refills: 3 | Status: SHIPPED | OUTPATIENT
Start: 2022-11-17 | End: 2023-11-17

## 2022-11-17 RX ORDER — CETIRIZINE HYDROCHLORIDE 10 MG/1
10 TABLET ORAL DAILY
COMMUNITY

## 2022-11-17 NOTE — TELEPHONE ENCOUNTER
I called and left a voicemail. We will hold the [employer] [facility] shipment until after he sees Dr. Mckeon on 11/23/22. I sent eliquis in for his portal vein thrombosis.    Quinn Small M.D.  Hematology/Oncology  [facility] [address], [address]  [place] [address]  Phone: [phone]  Fax: [phone]

## 2022-11-17 NOTE — TELEPHONE ENCOUNTER
Specialty Pharmacy - Initial Clinical Assessment    Specialty Medication Orders Linked to Encounter      Flowsheet Row Most Recent Value   Medication #1 lenvatinib 12 mg/day (4 mg x 3) Cap (Order#[id number], Rx#[id number])          Patient Diagnosis   C22.0 - Hepatocellular carcinoma    Subjective    Corey Esquivel is a 82 y.o. male, who is followed by the specialty pharmacy service for management and education.    Recent Encounters       Date Type Provider Description    11/17/2022 Specialty Pharmacy Farnaz Winn, Jenny Initial Clinical Assessment    11/16/2022 Specialty Pharmacy Yue Blue, Jenny Referral Authorization          Clinical call attempts since last clinical assessment   No call attempts found.     Current Outpatient Medications   Medication Sig Note    cetirizine (ZYRTEC) 10 MG tablet Take 10 mg by mouth once daily.     hydroCHLOROthiazide (HYDRODIURIL) 25 MG tablet Take 25 mg by mouth once daily.     hydrOXYzine HCL (ATARAX) 25 MG tablet Take 1 tablet (25 mg total) by mouth 3 (three) times daily as needed for Anxiety.     lisinopriL 10 MG tablet Take 10 mg by mouth once daily.     oxyCODONE (ROXICODONE) 5 MG immediate release tablet Take 1 tablet (5 mg total) by mouth every 4 (four) hours as needed for Pain.     traMADoL (ULTRAM-ER) 100 MG Tb24 Take 100 mg by mouth once daily. PRN     ursodioL (ACTIGALL) 300 mg capsule Take 1 capsule (300 mg total) by mouth 2 (two) times daily.     apixaban (ELIQUIS) 5 mg Tab Take 1 tablet (5 mg total) by mouth 2 (two) times daily.     GLUC/MICHAEL-MSM#2/C/D3/MARK/BORN (GLUCOSAM-CHONDR-MSM WITH VIT D ORAL) Take by mouth. 11/17/2022: Pt reports to no longer taking the medication.    lenvatinib 12 mg/day (4 mg x 3) Cap Take  3 capsules (12 mg) by mouth once daily.    Last reviewed on 11/15/2022  3:18 PM by Quinn Small MD    Review of patient's allergies indicates:  No Known AllergiesLast reviewed on  11/17/2022 9:30 AM by Farnaz Winn    Drug  Interactions    Drug interactions evaluated: yes  Clinically relevant drug interactions identified: no           Assessment Questions - Documented Responses      Flowsheet Row Most Recent Value   Assessment    During the past 4 weeks, has patient missed any activities due to condition or medication? No   During the past 4 weeks, did patient have any of the following urgent care visits? None   Goals of Therapy Status Discussed (new start)   Status of the patients ability to self-administer: Is Able   All education points have been covered with patient? Yes, supplemental printed education provided   Welcome packet contents reviewed and discussed with patient? Yes   Assesment completed? Yes   Plan Therapy being initiated   Do you need to open a clinical intervention (i-vent)? Yes   Do you want to schedule first shipment? Yes   Medication #1 Assessment Info    Patient status New medication, New to [facility]   Is this medication appropriate for the patient? Yes   Is this medication effective? Not yet started          Refill Questions - Documented Responses      Flowsheet Row Most Recent Value   Patient Availability and HIPAA Verification    Does patient want to proceed with activity? Yes   HIPAA/medical authority confirmed? Yes   Relationship to patient of person spoken to? Spouse/Significant Other   Refill Screening Questions    When does the patient need to receive the medication? 11/21/22   Refill Delivery Questions    How will the patient receive the medication? MEDRx   When does the patient need to receive the medication? 11/21/22   Shipping Address Home   Address in [facility] confirmed and updated if neccessary? Yes   Expected Copay ($) 0   Is the patient able to afford the medication copay? Yes   Payment Method zero copay   Days supply of Refill 30   Supplies needed? --  [Eucerin cream, hydrocortisone]   Refill activity completed? Yes   Refill activity plan Refill scheduled   Shipment/Pickup Date: 11/18/22       "      Objective    He has no past medical history on file.    Tried/failed medications: unknonw    BP Readings from Last 4 Encounters:   11/15/22 (!) 141/63   11/09/22 (!) 159/77   09/29/22 (!) 150/75   09/19/22 (!) 148/80     Ht Readings from Last 4 Encounters:   11/07/22 6' 4" (1.93 m)   09/29/22 6' 4" (1.93 m)   09/19/22 6' 4" (1.93 m)   11/22/19 6' 4" (1.93 m)     Wt Readings from Last 4 Encounters:   11/15/22 117.7 kg (259 lb 7.7 oz)   11/08/22 118.3 kg (260 lb 12.9 oz)   09/29/22 117.9 kg (260 lb)   09/19/22 122.5 kg (270 lb)     Recent Labs   Lab Result Units 11/15/22  1254 11/09/22  0254 11/08/22  0331 11/07/22  0926   RBC M/uL 3.94 L 3.53 L  --  3.95 L   Hemoglobin g/dL 13.1 L 11.7 L  --  12.9 L   Hematocrit % 37.9 L 33.8 L  --  38.7 L   WBC K/uL 7.55 5.45  --  4.96   Gran # (ANC) K/uL 3.9 2.8  --  2.8   Gran % % 51.3 51.1  --  56.5   Platelets K/uL 246 165  --  147 L   Sodium mmol/L 129 L 134 L 135 L 133 L   Potassium mmol/L 5.0 4.1 4.1 5.1   Chloride mmol/L 96 102 105 104   Glucose mg/dL 132 H 91 117 H 98   BUN mg/dL 30 H 22 28 H 23   Creatinine mg/dL 1.4 1.2 1.3 1.2   Calcium mg/dL 9.3 8.6 L 8.6 L 8.8   Total Protein g/dL 6.5 5.3 L 5.4 L 6.4   Albumin g/dL 2.4 L 2.2 L 2.2 L 2.7 L   Total Bilirubin mg/dL 6.0 H 4.6 H 4.3 H 4.0 H   Alkaline Phosphatase U/L 326 H 254 H 265 H 315 H   AST U/L 227 H 109 H 100 H 124 H   ALT U/L 100 H 66 H 64 H 75 H     The goals of cancer treatment include:  Achieving remission of cancer, if possible  Reducing tumor size and spread of cancer, if remission is not possible  Minimizing pain and symptoms of the cancer  Preventing infection and other complications of treatment  Promoting adequate nutrition  Encouraging proper hydration  Improving or maintaining quality of life  Maintaining optimal therapy adherence  Minimizing and managing side effects    Goals of Therapy Status: Discussed (new start)    Assessment/Plan  Patient plans to start therapy on 11/21/22      Indication, " dosage, appropriateness, effectiveness, safety and convenience of his specialty medication(s) were reviewed today.     Patient Education   Patient received education on the following:   Expectations and possible outcomes of therapy  Proper use, timely administration, and missed dose management  Duration of therapy  Side effects, including prevention, minimization, and management  Contraindications and safety precautions  New or changed medications, including prescribe and over the counter medications and supplements  Reviews recommended vaccinations, as appropriate  Storage, safe handling, and disposal    Pt monitors BP at home. Most recent /83. Counseled monitoring BP more frequently when starting the medication.   Pt expressed concern on a new prescription for Eliquis. Pt was not aware that he would receive medication. Messaged provider to follow-up on medication    Tasks added this encounter   12/14/2022 - Refill Call (Auto Added)  5/9/2023 - Clinical - Follow Up Assesement (180 day)   Tasks due within next 3 months   No tasks due.     Farnaz Winn, PharmD  [facility]  [address]  [facility] [address]  Phone: [phone]  Fax: [phone]

## 2022-11-17 NOTE — TELEPHONE ENCOUNTER
well they only removed 60 ml so no fluid pill for now    Per Leigh Ann Leal then asked should he continue the fluid med he is on now?   Per Dr. Carrion

## 2022-11-17 NOTE — PROGRESS NOTES
HEPATOLOGY CONSULTATION    Referring Physician: Quinn Small MD  Current Corresponding Physician: Quinn Small MD, Niecy Davis MD, Mumtaz Mckeon MD    Reason for Consultation: Consultation for evaluation of Cirrhosis and Hepatocellular Carcinoma    History of Present Illness: Corey Esquivel is a 82 y.o. male who presents for evaluation of cirrhosis and HCC.    He was recently admitted 11/7/22-11/9/22: presented with SOB, CHANDRA, chest tightness for 2-3 months.  On admission, his vital signs are unremarkable.   Lab results were remarkable for elevated total bili 4.0, elevated alk phosphatase 315 and elevtaed , and ALT 75. Negative initial Trop. Bnp wnl.     Liver US with doppler 11/7/22: In correlation with same day CT, findings concerning for at least partially thrombosed intrahepatic main portal vein near the confluence and right portal vein (with reversed flow). Cirrhosis with sequela of portal hypertension to include splenomegaly and mild upper abdominal ascites.  Indeterminate lesion in the right hepatic lobe.  Suggest further correlation with nonemergent liver protocol CT or MRI.  CT abdo pelvis w contrast 11/7/22: There is nodular contour of the liver, suggestive of hepatic cirrhosis; There are multiple splenic collateral vessels.  MRI abdo w wo contrast 11/8/22 INC MRCP: Cirrhotic liver morphology.  Focal mass in the posterior right hepatic lobe measures 3.5 cm.  No early arterial enhancement is seen, but there is a peripheral pseudo capsule identified on subsequent contrast enhanced sequences.  There is nonenhancing thrombus within the intrahepatic right portal vein and there is nonvisualization of a patent left portal vein which may be chronically thrombosed.  There is diffuse heterogeneous perfusion to the liver parenchyma which may be related to portal venous abnormalities, although diffuse infiltrative tumor is difficult to exclude with certainty. The extrahepatic biliary radicles are  normal.  There is smooth undulation of the contours of the intrahepatic biliary radicles  AFP 11/8/22: 3003    Dr Small, oncology planning to start lenvatinib next week. Pt has requested a second opinion from oncology.    LVP 11/8/22: WBC 1251, ANC 50, albumin 0.9, protein 1.4    Labs 11/8/22: AFP 3003  11/15/22: Tbil 6.0, , aST 227, ALKP 326    Currently, he endorses fatigue, pruritis, jaundice, abdominal pain/distention. Feels unwell after standing for some time.      Chief Complaint   Patient presents with    Cirrhosis    Hepatocellular Carcinoma       History reviewed. No pertinent past medical history.  Outpatient Encounter Medications as of 11/17/2022   Medication Sig Dispense Refill    apixaban (ELIQUIS) 5 mg Tab Take 1 tablet (5 mg total) by mouth 2 (two) times daily. 60 tablet 11    cetirizine (ZYRTEC) 10 MG tablet Take 10 mg by mouth once daily.      GLUC/MICHAEL-MSM#2/C/D3/MARK/BORN (GLUCOSAM-CHONDR-MSM WITH VIT D ORAL) Take by mouth.      hydroCHLOROthiazide (HYDRODIURIL) 25 MG tablet Take 25 mg by mouth once daily.      hydrOXYzine HCL (ATARAX) 25 MG tablet Take 1 tablet (25 mg total) by mouth 3 (three) times daily as needed for Anxiety. 30 tablet 1    lenvatinib 12 mg/day (4 mg x 3) Cap Take  3 capsules (12 mg) by mouth once daily. 90 capsule 11    lisinopriL 10 MG tablet Take 10 mg by mouth once daily.      oxyCODONE (ROXICODONE) 5 MG immediate release tablet Take 1 tablet (5 mg total) by mouth every 4 (four) hours as needed for Pain. 40 tablet 0    traMADoL (ULTRAM-ER) 100 MG Tb24 Take 100 mg by mouth once daily. PRN      ursodioL (ACTIGALL) 300 mg capsule Take 1 capsule (300 mg total) by mouth 2 (two) times daily. 60 capsule 11    [DISCONTINUED] cetirizine HCl/pseudoephedrine (ZYRTEC-D ORAL) Take by mouth.       No facility-administered encounter medications on file as of 11/17/2022.     Review of patient's allergies indicates:  No Known Allergies  History reviewed. No pertinent family  history.    Social History     Socioeconomic History    Marital status:    Tobacco Use    Smoking status: Former    Smokeless tobacco: Former   Substance and Sexual Activity    Alcohol use: No    Drug use: No     Review of Systems   Constitutional: Negative.    HENT: Negative.     Eyes: Negative.    Respiratory: Negative.     Cardiovascular: Negative.    Gastrointestinal: Negative.    Genitourinary: Negative.    Musculoskeletal: Negative.    Skin: Negative.    Neurological: Negative.    Psychiatric/Behavioral: Negative.     Vitals:    11/17/22 1117   BP: (!) 143/64   Pulse: 85   Resp: 19   Temp: 98.7 °F (37.1 °C)        Physical Exam  Vitals reviewed.   Constitutional:       Appearance: He is well-developed.   HENT:      Head: Normocephalic and atraumatic.   Eyes:      General: No scleral icterus.     Conjunctiva/sclera: Conjunctivae normal.      Pupils: Pupils are equal, round, and reactive to light.   Neck:      Thyroid: No thyromegaly.   Cardiovascular:      Rate and Rhythm: Normal rate and regular rhythm.      Heart sounds: Normal heart sounds.   Pulmonary:      Effort: Pulmonary effort is normal.      Breath sounds: Normal breath sounds. No rales.   Abdominal:      General: Bowel sounds are normal. There is no distension.      Palpations: Abdomen is soft. There is no mass.      Tenderness: There is no abdominal tenderness.   Musculoskeletal:         General: Normal range of motion.      Cervical back: Normal range of motion and neck supple.   Skin:     General: Skin is warm and dry.      Findings: No rash.   Neurological:      Mental Status: He is alert and oriented to person, place, and time.       MELD-Na score: 23 at 11/15/2022 12:54 PM  MELD score: 17 at 11/15/2022 12:54 PM  Calculated from:  Serum Creatinine: 1.4 mg/dL at 11/15/2022 12:54 PM  Serum Sodium: 129 mmol/L at 11/15/2022 12:54 PM  Total Bilirubin: 6.0 mg/dL at 11/15/2022 12:54 PM  INR(ratio): 1.1 at 11/15/2022 12:54 PM  Age: 82  years    Lab Results   Component Value Date     (H) 11/15/2022    BUN 30 (H) 11/15/2022    CREATININE 1.4 11/15/2022    CALCIUM 9.3 11/15/2022     (L) 11/15/2022    K 5.0 11/15/2022    CL 96 11/15/2022    PROT 6.5 11/15/2022    CO2 24 11/15/2022    ANIONGAP 9 11/15/2022    WBC 7.55 11/15/2022    RBC 3.94 (L) 11/15/2022    HGB 13.1 (L) 11/15/2022    HCT 37.9 (L) 11/15/2022    MCV 96 11/15/2022    MCH 33.2 (H) 11/15/2022    MCHC 34.6 11/15/2022     Lab Results   Component Value Date    RDW 17.5 (H) 11/15/2022     11/15/2022    MPV 11.2 11/15/2022    GRAN 3.9 11/15/2022    GRAN 51.3 11/15/2022    LYMPH 2.5 11/15/2022    LYMPH 32.8 11/15/2022    MONO 0.9 11/15/2022    MONO 12.1 11/15/2022    EOSINOPHIL 2.1 11/15/2022    BASOPHIL 0.8 11/15/2022    EOS 0.2 11/15/2022    BASO 0.06 11/15/2022    APTT 25.8 11/07/2022    BNP 54 11/07/2022    CHOL 150 10/31/2019    TRIG 70 10/31/2019    HDL 49 10/31/2019    CHOLHDL 32.7 10/31/2019    TOTALCHOLEST 3.1 10/31/2019    ALBUMIN 2.4 (L) 11/15/2022    BILIDIR 2.3 (H) 11/08/2022     (H) 11/15/2022     (H) 11/15/2022    ALKPHOS 326 (H) 11/15/2022    MG 2.0 11/07/2022    LABPROT 11.6 11/15/2022    INR 1.1 11/15/2022       Assessment and Plan:  Corey Esquivel is a 82 y.o. male with cirrhosis and hepatocellular carcinoma. He appears to have HCC, likely multifocal with AFP >1000. Agree with trial of lenvatinib. Will repeat liver tests including fractionation of bilirubin to see if elevation in bilirubin is due to indirect bilirubin >direct bilirubin. Will review films in radiology conf to see if any locoregional therapy is possible and to confirm no role for ERCP/PTC. Other recommendations:  Cirrhosis: EGD to screen for varices  Portal vein thrombosis: no anticoagulation   Pruritus: likely elevated bile acids- check bile acids levels and start colestid 2 g bid  Ascites: no significant fluid retention on imaging-will not start diuretics at present-monitor  weights  Check BP and HR when start to feel unwell    Return 4 weeks

## 2022-11-17 NOTE — PATIENT INSTRUCTIONS
EGD to look for varices  No blood thinner  Labs today  Coelstid for itching  Review films on tueday  See Dr Mckeon as scheduled  Start immune therapy on Monday  Monitor weights for fluid build up  Check BP and HR when start to feel unwell    [phone]

## 2022-11-18 LAB
A1AT SERPL-MCNC: 227 MG/DL (ref 100–190)
ANA PATTERN 1: NORMAL
ANA SER QL IF: POSITIVE
ANA TITR SER IF: NORMAL {TITER}
MITOCHONDRIA AB TITR SER IF: NORMAL {TITER}
SMOOTH MUSCLE AB TITR SER IF: NORMAL {TITER}

## 2022-11-19 LAB — BILE AC SERPL-SCNC: 273 MCMOL/L

## 2022-11-21 ENCOUNTER — TELEPHONE (OUTPATIENT)
Dept: HEPATOLOGY | Facility: CLINIC | Age: 82
End: 2022-11-21
Payer: MEDICARE

## 2022-11-21 NOTE — TELEPHONE ENCOUNTER
Patient: Corey Esquivel       MRN: [id number]      : 1940     Age: 82 y.o.  [address] [id number]    Providers  Hepatologists: Renuka Carrion MD  Surgeons:  none  Radiologists:  none  Advanced Practice providers:     Priority of review: Cancer    Patient Transplant Status: Not a candidate    Reason for presentation: Other determine if any role for locoregional tx or ERCP/PTC (since Tbil rising)    Clinical Summary: Corey Esquivel is a 82 y.o. male who presents for evaluation of cirrhosis and HCC.    He was recently admitted 22-22: presented with SOB, CHANDRA, chest tightness for 2-3 months.  On admission, his vital signs are unremarkable.   Lab results were remarkable for elevated total bili 4.0, elevated alk phosphatase 315 and elevtaed , and ALT 75. Negative initial Trop. Bnp wnl.     Liver US with doppler 22: In correlation with same day CT, findings concerning for at least partially thrombosed intrahepatic main portal vein near the confluence and right portal vein (with reversed flow). Cirrhosis with sequela of portal hypertension to include splenomegaly and mild upper abdominal ascites.  Indeterminate lesion in the right hepatic lobe.  Suggest further correlation with nonemergent liver protocol CT or MRI.  CT abdo pelvis w contrast 22: There is nodular contour of the liver, suggestive of hepatic cirrhosis; There are multiple splenic collateral vessels.  MRI abdo w wo contrast 22 INC MRCP: Cirrhotic liver morphology.  Focal mass in the posterior right hepatic lobe measures 3.5 cm.  No early arterial enhancement is seen, but there is a peripheral pseudo capsule identified on subsequent contrast enhanced sequences.  There is nonenhancing thrombus within the intrahepatic right portal vein and there is nonvisualization of a patent left portal vein which may be chronically thrombosed.  There is diffuse heterogeneous perfusion to the liver parenchyma which may be related to  portal venous abnormalities, although diffuse infiltrative tumor is difficult to exclude with certainty. The extrahepatic biliary radicles are normal.  There is smooth undulation of the contours of the intrahepatic biliary radicles  AFP 11/8/22: 3003    Dr Small, oncology planning to start lenvatinib next week. Pt has requested a second opinion from oncology.    LVP 11/8/22: WBC 1251, ANC 50, albumin 0.9, protein 1.4    Labs 11/8/22: AFP 3003  11/15/22: Tbil 6.0, , aST 227, ALKP 326    Currently, he endorses fatigue, pruritis, jaundice, abdominal pain/distention. Feels unwell after standing for some time.      Imaging to be reviewed: MRI    HCC Treatment History:     ABO:     Platelets:   Lab Results   Component Value Date/Time     11/17/2022 12:13 PM     Creatinine:   Lab Results   Component Value Date/Time    CREATININE 1.5 (H) 11/17/2022 12:13 PM     Bilirubin:   Lab Results   Component Value Date/Time    BILITOT 8.8 (H) 11/17/2022 12:13 PM     AFP Last 3 each if available:   Lab Results   Component Value Date/Time    AFP 5054 (H) 11/17/2022 12:13 PM    AFP 3003 (H) 11/08/2022 11:04 AM       MELD: MELD-Na score: 24 at 11/17/2022 12:13 PM  MELD score: 20 at 11/17/2022 12:13 PM  Calculated from:  Serum Creatinine: 1.5 mg/dL at 11/17/2022 12:13 PM  Serum Sodium: 131 mmol/L at 11/17/2022 12:13 PM  Total Bilirubin: 8.8 mg/dL at 11/17/2022 12:13 PM  INR(ratio): 1.1 at 11/17/2022 12:13 PM  Age: 82 years    Plan:     Follow-up Provider:

## 2022-11-21 NOTE — TELEPHONE ENCOUNTER
Returned the call and went to voice mail  I left a message asking her to call me back at [phone] need to know what referral----- Message from Yudy Beckford sent at 11/21/2022  1:18 PM CST -----  Regarding: Fax Referral  Patient's daughter, Leigh Ann Esquivel, calling to get a referral from Dr. Carrion fax to [facility].      Fax: [phone]  ( [facility])          Call: [phone] ( Leigh Ann's phone)

## 2022-11-22 ENCOUNTER — CONFERENCE (OUTPATIENT)
Dept: TRANSPLANT | Facility: CLINIC | Age: 82
End: 2022-11-22
Payer: MEDICARE

## 2022-11-22 ENCOUNTER — TELEPHONE (OUTPATIENT)
Dept: HEPATOLOGY | Facility: CLINIC | Age: 82
End: 2022-11-22
Payer: MEDICARE

## 2022-11-22 ENCOUNTER — TELEPHONE (OUTPATIENT)
Dept: HEMATOLOGY/ONCOLOGY | Facility: CLINIC | Age: 82
End: 2022-11-22
Payer: MEDICARE

## 2022-11-22 LAB
ANTI SM ANTIBODY: 0.09 RATIO (ref 0–0.99)
ANTI SM/RNP ANTIBODY: 0.07 RATIO (ref 0–0.99)
ANTI-SM INTERPRETATION: NEGATIVE
ANTI-SM/RNP INTERPRETATION: NEGATIVE
ANTI-SSA ANTIBODY: 0.1 RATIO (ref 0–0.99)
ANTI-SSA INTERPRETATION: NEGATIVE
ANTI-SSB ANTIBODY: 0.06 RATIO (ref 0–0.99)
ANTI-SSB INTERPRETATION: NEGATIVE
DSDNA AB SER-ACNC: NORMAL [IU]/ML

## 2022-11-22 NOTE — TELEPHONE ENCOUNTER
Patient: Corey Esquivel       MRN: [id number]      : 1940     Age: 82 y.o.  [address] [id number]    Providers  Hepatologists: Renuka Carrion MD  Surgeons:  none  Radiologists:  none  Advanced Practice providers:     Priority of review: Cancer    Patient Transplant Status: Not a candidate    Reason for presentation: Other determine if any role for locoregional tx or ERCP/PTC (since Tbil rising)    Clinical Summary: Corey Esquivel is a 82 y.o. male who presents for evaluation of cirrhosis and HCC.    He was recently admitted 22-22: presented with SOB, CHANDRA, chest tightness for 2-3 months.  On admission, his vital signs are unremarkable.   Lab results were remarkable for elevated total bili 4.0, elevated alk phosphatase 315 and elevtaed , and ALT 75. Negative initial Trop. Bnp wnl.     Liver US with doppler 22: In correlation with same day CT, findings concerning for at least partially thrombosed intrahepatic main portal vein near the confluence and right portal vein (with reversed flow). Cirrhosis with sequela of portal hypertension to include splenomegaly and mild upper abdominal ascites.  Indeterminate lesion in the right hepatic lobe.  Suggest further correlation with nonemergent liver protocol CT or MRI.  CT abdo pelvis w contrast 22: There is nodular contour of the liver, suggestive of hepatic cirrhosis; There are multiple splenic collateral vessels.  MRI abdo w wo contrast 22 INC MRCP: Cirrhotic liver morphology.  Focal mass in the posterior right hepatic lobe measures 3.5 cm.  No early arterial enhancement is seen, but there is a peripheral pseudo capsule identified on subsequent contrast enhanced sequences.  There is nonenhancing thrombus within the intrahepatic right portal vein and there is nonvisualization of a patent left portal vein which may be chronically thrombosed.  There is diffuse heterogeneous perfusion to the liver parenchyma which may be related to  portal venous abnormalities, although diffuse infiltrative tumor is difficult to exclude with certainty. The extrahepatic biliary radicles are normal.  There is smooth undulation of the contours of the intrahepatic biliary radicles  AFP 11/8/22: 3003    Dr Small, oncology planning to start lenvatinib next week. Pt has requested a second opinion from oncology.    LVP 11/8/22: WBC 1251, ANC 50, albumin 0.9, protein 1.4    Labs 11/8/22: AFP 3003  11/15/22: Tbil 6.0, , aST 227, ALKP 326    Currently, he endorses fatigue, pruritis, jaundice, abdominal pain/distention. Feels unwell after standing for some time.      Imaging to be reviewed: MRI    HCC Treatment History:     ABO:     Platelets:   Lab Results   Component Value Date/Time     11/17/2022 12:13 PM     Creatinine:   Lab Results   Component Value Date/Time    CREATININE 1.5 (H) 11/17/2022 12:13 PM     Bilirubin:   Lab Results   Component Value Date/Time    BILITOT 8.8 (H) 11/17/2022 12:13 PM     AFP Last 3 each if available:   Lab Results   Component Value Date/Time    AFP 5054 (H) 11/17/2022 12:13 PM    AFP 3003 (H) 11/08/2022 11:04 AM       MELD: MELD-Na score: 24 at 11/17/2022 12:13 PM  MELD score: 20 at 11/17/2022 12:13 PM  Calculated from:  Serum Creatinine: 1.5 mg/dL at 11/17/2022 12:13 PM  Serum Sodium: 131 mmol/L at 11/17/2022 12:13 PM  Total Bilirubin: 8.8 mg/dL at 11/17/2022 12:13 PM  INR(ratio): 1.1 at 11/17/2022 12:13 PM  Age: 82 years    Committee Discussion/Plan:  Heterogenous liver with a 3.5 cm lesion with pseudocapsule in a cirrhotic liver. Bilirubin is elevate so unable to do locoregional therapy at this time. Referral to Oncology was done already.      Follow-up Provider: Dr Carrion

## 2022-11-22 NOTE — TELEPHONE ENCOUNTER
----- Message from Rk Rivera sent at 11/22/2022  1:26 PM CST -----  Type:  Needs Medical Advice    Who Called: Daughter   Reason:DANNI never got his referral  Would the patient rather a call back or a response via [employer]?   Best Call Back Number: [phone]  Additional Information: [phone]/ fax

## 2022-11-22 NOTE — TELEPHONE ENCOUNTER
Call to pt/daughter re radiology review. Appears to have one discrete lesion and likely infiltrative tumor. Tbil has increased. [facility], feeling weak. Looks jaundiced. Labs pending.

## 2022-11-23 LAB
A1AT PHENOTYP SERPL-IMP: ABNORMAL
A1AT SERPL NEPH-MCNC: 249 MG/DL (ref 100–190)
CLINICAL BIOCHEMIST REVIEW: NORMAL
PETH 16:0/18.1 (POPETH): <10 NG/ML
PETH 16:0/18.2 (PLPETH): <10 NG/ML